# Patient Record
Sex: FEMALE | Race: WHITE | Employment: FULL TIME | URBAN - METROPOLITAN AREA
[De-identification: names, ages, dates, MRNs, and addresses within clinical notes are randomized per-mention and may not be internally consistent; named-entity substitution may affect disease eponyms.]

---

## 2017-01-19 ENCOUNTER — DOCUMENTATION ONLY (OUTPATIENT)
Dept: BEHAVIORAL/MENTAL HEALTH CLINIC | Age: 37
End: 2017-01-19

## 2017-01-19 NOTE — PROGRESS NOTES
Received fax from [employer]. client was in residential treatment program for Bulimia purging type, discharged on 12/13/16. Her d/c meds were- abilify 5mg, lexapro 20 mg daily, melatonin HS.

## 2017-01-26 ENCOUNTER — OFFICE VISIT (OUTPATIENT)
Dept: BEHAVIORAL/MENTAL HEALTH CLINIC | Age: 37
End: 2017-01-26

## 2017-01-26 VITALS
HEART RATE: 59 BPM | WEIGHT: 286 LBS | SYSTOLIC BLOOD PRESSURE: 112 MMHG | DIASTOLIC BLOOD PRESSURE: 85 MMHG | OXYGEN SATURATION: 96 %

## 2017-01-26 DIAGNOSIS — F60.3 BORDERLINE PERSONALITY DISORDER (HCC): ICD-10-CM

## 2017-01-26 DIAGNOSIS — F50.2 BULIMIA NERVOSA, PURGING TYPE: Primary | ICD-10-CM

## 2017-01-26 DIAGNOSIS — F41.9 ANXIETY AND DEPRESSION: ICD-10-CM

## 2017-01-26 DIAGNOSIS — F40.10 SOCIAL ANXIETY DISORDER: ICD-10-CM

## 2017-01-26 DIAGNOSIS — F32.A ANXIETY AND DEPRESSION: ICD-10-CM

## 2017-01-26 RX ORDER — ARIPIPRAZOLE 10 MG/1
10 TABLET ORAL DAILY
Qty: 30 TAB | Refills: 0 | Status: SHIPPED | OUTPATIENT
Start: 2017-01-26 | End: 2017-02-23 | Stop reason: SDUPTHER

## 2017-01-26 RX ORDER — ESCITALOPRAM OXALATE 20 MG/1
TABLET ORAL
Qty: 30 TAB | Refills: 0 | Status: SHIPPED | OUTPATIENT
Start: 2017-01-26 | End: 2017-02-28 | Stop reason: SDUPTHER

## 2017-01-26 NOTE — PROGRESS NOTES
Psychiatric Outpatient Progress Note    Account Number:  [de-identified]  Name: Calvin Cowan    SUBJECTIVE:   CHIEF COMPLAINT:  Calvin Cowan is a 29 y.o. female and was seen today for follow-up of psychiatric condition and psychotropic medication management. HPI:    Eduar Farias reports the following psychiatric symptoms:  bulimia nervosa, MDD, moderate,generalized anxiety disorder. Long h/o bulimia with purging. H/o anorexia nervosa,  H/o Bulimia and purging x 21 years. The symptoms have been present for many years and are of moderate to higher severity. The symptoms occur 2 days a week. Clinet is eating and purging x 3 per week. Client reported sleeping for 8 hrs and denied any difficulty initiating and maintaining sleep. Reported appetite is variable, feels hungry at times, not following meal plan but binging a lot and purging x 5 per week. Reports acid reflux at times. Reported energy level is good, has difficulty focus and concentration at work. Has variable motivation, feels hopeless and guilty  r/t binging and purging. Denied any passive suicide thoughts. Has anxiety daily and all the time. Worries about making mistakes at work.   recently was in 6 weeks residential program and behavior was under control and relapsed on binging in few days. Contributing factors include: stressful job, more conscious at work. No support from family. Patient denies SI/HI/SIB. Side Effects:  none      Fam/Soc Hx (from [place] with updates):    Family History   Problem Relation Age of Onset    Cancer Maternal Grandmother     Heart Disease Maternal Grandfather       Social History   Substance Use Topics    Smoking status: Never Smoker    Smokeless tobacco: Never Used    Alcohol use Yes      Comment: socially       REVIEW OF SYSTEMS:  Psychiatric:  depression, anxiety.   Appetite:no change from normal   Sleep: no change       OBJECTIVE:                 Mental Status exam: WNL except for      Sensorium  oriented to time, place and person   Relations cooperative    Eye Contact    appropriate   Appearance:  age appropriate, casually dressed, within normal Limits and obese   Motor Behavior/Gait:  gait stable and within normal limits   Speech:  normal pitch and normal volume   Thought Process: goal directed, logical and within normal limits   Thought Content free of delusions and free of hallucinations   Suicidal ideations no plan , no intention and none   Homicidal ideations no plan , no intention and none   Mood:  anxious and depressed   Affect:  anxious and depressed   Memory recent  adequate   Memory remote:  adequate   Concentration:  adequate   Abstraction:  abstract   Insight:  fair   Reliability fair   Judgment:  fair       MEDICAL DECISION MAKING  Data: pertinent labs, imaging, medical records and diagnostic tests reviewed and incorporated in diagnosis and treatment plan    No Known Allergies     Current Outpatient Prescriptions   Medication Sig Dispense Refill    escitalopram oxalate (LEXAPRO) 20 mg tablet TAKE 1 TABLET BY MOUTH ONCE DAILY 30 Tab 0    ARIPiprazole (ABILIFY) 10 mg tablet Take 1 Tab by mouth daily. 30 Tab 0    busPIRone (BUSPAR) 5 mg tablet Take 1 Tab by mouth three (3) times daily (with meals). 90 Tab 0        Visit Vitals    /85 (BP 1 Location: Left arm, BP Patient Position: Sitting)    Pulse (!) 59    Wt 129.7 kg (286 lb)    SpO2 96%         Problems addressed today:  Bulimia nervosa purging type, major depressive disorder , moderate , generalized anxiety disorder, Borderline personality disorder. Assessment:   Calvin Husbands  is a 29 y.o.  female  is not responding to treatment. Long h/o bulimia nervosa, MDD, moderate,generalized anxiety disorder. Long h/o bulimia with purging, stated in high school. Symptoms are occur daily. Reported benefits with Escitalopram and Abilify. Client has residential treatment program recently and relapsed on binging and purging again.   Has guilty feeling and hopelessness r/t binging. Client is attending ACT therapy for bulimia x 3 per week. Client reported no benefit with Buspar. Had trial of risperidone, topiramate in past and had no benefit. Feels depressed for not receiving support from her spouse. H/o self mutilating behavior till age 26. Client's is not undergoing DBT and her therapist told her that she does not have BPD. Discussed importance of DBT and CBT in her treatment plan. Patient denies SI/HI/SIB. No evidence of AH/VH or delusions. Plan to increase the dose of Abilify to target depression. Risk Scoring- chronic illnesses and prescription drug management    Treatment Plan:  1. Medications:          Medication Changes/Adjustments: Increase abilify to 10 mg daily                                                                Continue Lexapro 20 mg daily                                                                Discontinue Buspar- no benefit      Current Outpatient Prescriptions   Medication Sig Dispense Refill    escitalopram oxalate (LEXAPRO) 20 mg tablet TAKE 1 TABLET BY MOUTH ONCE DAILY 30 Tab 0    ARIPiprazole (ABILIFY) 10 mg tablet Take 1 Tab by mouth daily. 30 Tab 0    busPIRone (BUSPAR) 5 mg tablet Take 1 Tab by mouth three (3) times daily (with meals). 90 Tab 0                  The following regarding medications was addressed:    (The risks and benefits of the proposed medication; the potential medication side effects ie    dry mouth, weight gain, GI upset, headache; patient given opportunity to ask questions)       2. Counseling and coordination of care including instructions for treatment, risks/benefits, risk factor reduction and patient/family education. She agrees with the plan. Patient instructed to call with any side effects, questions or issues. Instructed patient to call the clinic, and if after hours call the provider on call ifclient experiences any suicidal thought or ideas to hurt self or other.  Also instructed to call 911 or go to the ED. Patient verbalized understanding and agreed to call    3. Follow-up Disposition:  Return in about 4 weeks (around 2/23/2017) for med check and follow up. 4. Other: Nutritional/health counseling on diet and exercise. For reliable dietary information, go to www. EATRIGHT.org. PSYCHOTHERAPY:  approx 16 minutes  Type:  Supportive/Cognitive Behavioral psychotherapy provided  Focus:     Current problems- still purging and eating. Occupational issues- anxious in social setting   Interpersonal conflicts- spouse  Psychoeducation provided  Treatment plan reviewed with patient-including diagnosis and medications    Alexsandra is not progressing.     Fam Terrell NP  1/26/2017

## 2017-01-26 NOTE — PATIENT INSTRUCTIONS
Sleep Tips    What to avoid    · Do not have drinks with caffeine, such as coffee or black tea, for 8 hours before bed. · Do not smoke or use other types of tobacco near bedtime. Nicotine is a stimulant and can keep you awake. · Avoid drinking alcohol late in the evening, because it can cause you to wake in the middle of the night. · Do not eat a big meal close to bedtime. If you are hungry, eat a light snack. · Do not drink a lot of water close to bedtime, because the need to urinate may wake you up during the night. · Do not read or watch TV in bed. Use the bed only for sleeping and sexual activity. What to try    · Go to bed at the same time every night, and wake up at the same time every morning. Do not take naps during the day. · Keep your bedroom quiet, dark, and cool. · Get regular exercise, but not within 3 to 4 hours of your bedtime. · Sleep on a comfortable pillow and mattress. · If watching the clock makes you anxious, turn it facing away from you so you cannot see the time. · If you worry when you lie down, start a worry book. Well before bedtime, write down your worries, and then set the book and your concerns aside. · Try meditation or other relaxation techniques before you go to bed. · If you cannot fall asleep, get up and go to another room until you feel sleepy. Do something relaxing. Repeat your bedtime routine before you go to bed again. Make your house quiet and calm about an hour before bedtime. Turn down the lights, turn off the TV, log off the computer, and turn down the volume on music. This can help you relax after a busy day. Bulimia: Care Instructions  Your Care Instructions  Bulimia nervosa is an eating disorder. People with bulimia are very concerned about body shape and size and are afraid of gaining weight. They may crave food and find ways to eat a lot of it fast. This binge eating is often set off by stress or an emotional upset.  After overeating, people with bulimia may feel guilty, uncomfortable, or ashamed. They may vomit, use laxatives, or exercise excessively to get rid of the food they ate. Counseling to understand the condition and to learn ways to reduce stress is a big part of treatment for bulimia. Nutritional counseling can help you learn how to eat a healthy diet. It may help to have your family take part in family counseling so that they can support you. Treatment with medicines such as antidepressants also can help. Follow-up care is a key part of your treatment and safety. Be sure to make and go to all appointments, and call your doctor if you are having problems. It's also a good idea to know your test results and keep a list of the medicines you take. How can you care for yourself at home? Follow your treatment plan  Take your medicines exactly as prescribed. Call your doctor if you think you are having a problem with your medicine. Go to your counseling sessions. Call or talk with your counselor if you cannot attend or you think the sessions are not helping. Do not just stop going. Learn to be easier on yourself  Remember that feeling bad about yourself and not having hope is part of your condition. As you work with your doctor and counselors, you will start to feel better about yourself. Make a list of things you have learned, things you have done that were hard for you, or things you have changed about yourself. Do not blame yourself for having bulimia. Just work on getting better. Learn to accept help. Remember that your goal is to feel better each day. Take good care of yourself  Get enough sleep. Keep your room dark and quiet, and try to go to bed at the same time every night. Try to lower your stress. Manage your time, build a strong system of social support, and lead a healthy lifestyle. To lower your stress, try physical activity, slow deep breathing, relaxing your muscles, or getting a massage.   Do not use alcohol or illegal drugs. Learn the early signs of sudden, urgent food cravings. Eat a healthy, balanced diet. A balanced diet includes whole grains, dairy, fruits and vegetables, and protein. Eat a variety of foods from each of these groups so that you get all the nutrients you need. When should you call for help? Call 911 anytime you think you may need emergency care. For example, call if:  You feel hopeless or have thoughts of hurting yourself. You cough up blood. You vomit blood or what looks like coffee grounds. You pass reddish brown or very bloody stools. Call your doctor now or seek immediate medical care if:  You have pain in your belly. You have an irregular heartbeat. You feel dizzy or faint. Watch closely for changes in your health, and be sure to contact your doctor if you have any problems. Where can you learn more? Go to http://ricky-jennie.info/. Enter C591 in the search box to learn more about \"Bulimia: Care Instructions. \"  Current as of: July 26, 2016  Content Version: 11.1  © 8366-5103 [employer]. Care instructions adapted under license by [employer] (which disclaims liability or warranty for this information). If you have questions about a medical condition or this instruction, always ask your healthcare professional. [place] [address] any warranty or liability for your use of this information.   ·

## 2017-02-23 DIAGNOSIS — F32.A ANXIETY AND DEPRESSION: ICD-10-CM

## 2017-02-23 DIAGNOSIS — F50.2 BULIMIA NERVOSA, PURGING TYPE: ICD-10-CM

## 2017-02-23 DIAGNOSIS — F41.9 ANXIETY AND DEPRESSION: ICD-10-CM

## 2017-02-23 RX ORDER — ARIPIPRAZOLE 10 MG/1
10 TABLET ORAL DAILY
Qty: 7 TAB | Refills: 0 | Status: SHIPPED | OUTPATIENT
Start: 2017-02-23 | End: 2017-02-28 | Stop reason: SDUPTHER

## 2017-02-28 ENCOUNTER — OFFICE VISIT (OUTPATIENT)
Dept: BEHAVIORAL/MENTAL HEALTH CLINIC | Age: 37
End: 2017-02-28

## 2017-02-28 VITALS
HEART RATE: 74 BPM | OXYGEN SATURATION: 98 % | SYSTOLIC BLOOD PRESSURE: 120 MMHG | DIASTOLIC BLOOD PRESSURE: 87 MMHG | WEIGHT: 286 LBS

## 2017-02-28 DIAGNOSIS — F41.9 ANXIETY AND DEPRESSION: ICD-10-CM

## 2017-02-28 DIAGNOSIS — F40.10 SOCIAL ANXIETY DISORDER: ICD-10-CM

## 2017-02-28 DIAGNOSIS — F50.2 BULIMIA NERVOSA, PURGING TYPE: ICD-10-CM

## 2017-02-28 DIAGNOSIS — F32.A ANXIETY AND DEPRESSION: ICD-10-CM

## 2017-02-28 RX ORDER — ESCITALOPRAM OXALATE 20 MG/1
TABLET ORAL
Qty: 30 TAB | Refills: 1 | Status: SHIPPED | OUTPATIENT
Start: 2017-02-28 | End: 2017-04-26 | Stop reason: SDUPTHER

## 2017-02-28 RX ORDER — ARIPIPRAZOLE 15 MG/1
15 TABLET ORAL DAILY
Qty: 30 TAB | Refills: 1 | Status: SHIPPED | OUTPATIENT
Start: 2017-02-28 | End: 2017-04-26 | Stop reason: SDUPTHER

## 2017-02-28 NOTE — PROGRESS NOTES
Psychiatric Outpatient Progress Note    Account Number:  [de-identified]  Name: Demetri Lancaster    SUBJECTIVE:   CHIEF COMPLAINT:  Demetri Lancaster is a 29 y.o. female and was seen today for follow-up of psychiatric condition and psychotropic medication management. HPI:    Tony Mahoney reports the following psychiatric symptoms:  bulimia nervosa, MDD, moderate,generalized anxiety disorder. Long h/o bulimia with purging. H/o anorexia nervosa,  H/o Bulimia and purging x 21 years. The symptoms have been present for many years and are of moderate to higher severity. The symptoms occur 3 days a week. Client is eating and purging x 3 per week. Client reported sleeping for 8 hrs and denied any difficulty initiating and maintaining sleep. Reported appetite is variable, feels hungry at times, following meal plan reduced  purging x 3 per week. . Reported energy level is good, has difficulty focus and concentration at work. Has variable motivation, feels hopeless and guilty  r/t binging and purging. Denied any passive suicide thoughts. Has anxiety daily and all the time. Worries about making mistakes at work.  Contributing factors include: stressful job, more conscious at work. No support from family. Patient denies SI/HI/SIB. Side Effects:  none      Fam/Soc Hx (from [place] with updates):    Family History   Problem Relation Age of Onset    Cancer Maternal Grandmother     Heart Disease Maternal Grandfather       Social History   Substance Use Topics    Smoking status: Never Smoker    Smokeless tobacco: Never Used    Alcohol use Yes      Comment: socially       REVIEW OF SYSTEMS:  Psychiatric:  depression, anxiety.   Appetite:no change from normal   Sleep: no change                    Mental Status exam: WNL except for      Sensorium  oriented to time, place and person   Relations cooperative    Eye Contact    appropriate   Appearance:  age appropriate, casually dressed, within normal Limits and obese   Motor Behavior/Gait: gait stable and within normal limits   Speech:  normal pitch and normal volume   Thought Process: goal directed, logical and within normal limits   Thought Content free of delusions and free of hallucinations   Suicidal ideations no plan , no intention and none   Homicidal ideations no plan , no intention and none   Mood:  anxious and depressed   Affect:  anxious and depressed   Memory recent  adequate   Memory remote:  adequate   Concentration:  adequate   Abstraction:  abstract   Insight:  fair   Reliability fair   Judgment:  fair       MEDICAL DECISION MAKING  Data: pertinent labs, imaging, medical records and diagnostic tests reviewed and incorporated in diagnosis and treatment plan    No Known Allergies     Current Outpatient Prescriptions   Medication Sig Dispense Refill    ARIPiprazole (ABILIFY) 15 mg tablet Take 1 Tab by mouth daily. 30 Tab 1    escitalopram oxalate (LEXAPRO) 20 mg tablet TAKE 1 TABLET BY MOUTH ONCE DAILY 30 Tab 1        Visit Vitals    /87 (BP 1 Location: Left arm, BP Patient Position: Sitting)    Pulse 74    Wt 129.7 kg (286 lb)    SpO2 98%         Problems addressed today:  Bulimia nervosa purging type, major depressive disorder , moderate , generalized anxiety disorder, Borderline personality disorder. Assessment:   Misti Kirkland  is a 29 y.o.  obese female  is not responding to treatment. Long h/o bulimia nervosa, MDD, moderate,generalized anxiety disorder. Long h/o bulimia with purging, stated in high school. Symptoms are occur daily. Reported benefits with Escitalopram and Abilify. Client has residential treatment program recently and relapsed on binging and purging again. Has guilty feeling and mild hopelessness r/t binging. Reported decreased interest and decreased motivation. Had trial of risperidone, topiramate in past and had no benefit. H/o self mutilating behavior till age 26.   Reported some improvement in mood and decrease in urges to eat and purging and has reduced to 3 times a week from 5 days week. Plan to increase the dose of Abilify. Client has reduced therapy to twice a week. Discussed importance of DBT and CBT in her treatment plan and continuing it. She is currently on diet plan and exchange diet and is working with dietician and meets once a week. She has been under therapy and dietician for one year and feels that it's helping. Patient denies SI/HI/SIB. No evidence of AH/VH or delusions. Plan to increase the dose of Abilify to target depression. Risk Scoring- chronic illnesses and prescription drug management    Treatment Plan:  1. Medications:          Medication Changes/Adjustments: Increase abilify to 15 mg daily                                                                Continue Lexapro 20 mg daily                                                                Discontinue Buspar- no benefit      Current Outpatient Prescriptions   Medication Sig Dispense Refill    ARIPiprazole (ABILIFY) 15 mg tablet Take 1 Tab by mouth daily. 30 Tab 1    escitalopram oxalate (LEXAPRO) 20 mg tablet TAKE 1 TABLET BY MOUTH ONCE DAILY 30 Tab 1                  The following regarding medications was addressed:    (The risks and benefits of the proposed medication; the potential medication side effects ie    dry mouth, weight gain, GI upset, headache; patient given opportunity to ask questions)       2. Counseling and coordination of care including instructions for treatment, risks/benefits, risk factor reduction and patient/family education. She agrees with the plan. Patient instructed to call with any side effects, questions or issues. Instructed patient to call the clinic, and if after hours call the provider on call ifclient experiences any suicidal thought or ideas to hurt self or other. Also instructed to call 911 or go to the ED. Patient verbalized understanding and agreed to call    3.   Follow-up Disposition:  Return in about 2 months (around 2017) for med check and follow up. 4. Other: Nutritional/health counseling on diet and exercise. For reliable dietary information, go to www. EATRIGHT.org. PSYCHOTHERAPY:  approx 16 minutes  Type:  Supportive/Cognitive Behavioral psychotherapy provided  Focus:     Current problems-  purging and eating. Occupational issues- anxious in social setting and work. Interpersonal conflicts- spouse  Psychoeducation provided  Treatment plan reviewed with patient-including diagnosis and medications    Alexsandra is partially progressing.     Tran Maravilla NP  2017

## 2017-02-28 NOTE — PATIENT INSTRUCTIONS
Bulimia: Care Instructions  Your Care Instructions  Bulimia nervosa is an eating disorder. People with bulimia are very concerned about body shape and size and are afraid of gaining weight. They may crave food and find ways to eat a lot of it fast. This binge eating is often set off by stress or an emotional upset. After overeating, people with bulimia may feel guilty, uncomfortable, or ashamed. They may vomit, use laxatives, or exercise excessively to get rid of the food they ate. Counseling to understand the condition and to learn ways to reduce stress is a big part of treatment for bulimia. Nutritional counseling can help you learn how to eat a healthy diet. It may help to have your family take part in family counseling so that they can support you. Treatment with medicines such as antidepressants also can help. Follow-up care is a key part of your treatment and safety. Be sure to make and go to all appointments, and call your doctor if you are having problems. It's also a good idea to know your test results and keep a list of the medicines you take. How can you care for yourself at home? Follow your treatment plan  · Take your medicines exactly as prescribed. Call your doctor if you think you are having a problem with your medicine. · Go to your counseling sessions. Call or talk with your counselor if you cannot attend or you think the sessions are not helping. Do not just stop going. Learn to be easier on yourself  · Remember that feeling bad about yourself and not having hope is part of your condition. As you work with your doctor and counselors, you will start to feel better about yourself. · Make a list of things you have learned, things you have done that were hard for you, or things you have changed about yourself. · Do not blame yourself for having bulimia. Just work on getting better. · Learn to accept help. · Remember that your goal is to feel better each day.   Take good care of yourself  · Get enough sleep. Keep your room dark and quiet, and try to go to bed at the same time every night. · Try to lower your stress. Manage your time, build a strong system of social support, and lead a healthy lifestyle. To lower your stress, try physical activity, slow deep breathing, relaxing your muscles, or getting a massage. · Do not use alcohol or illegal drugs. · Learn the early signs of sudden, urgent food cravings. · Eat a healthy, balanced diet. A balanced diet includes whole grains, dairy, fruits and vegetables, and protein. Eat a variety of foods from each of these groups so that you get all the nutrients you need. When should you call for help? Call 911 anytime you think you may need emergency care. For example, call if:  · You feel hopeless or have thoughts of hurting yourself. · You cough up blood. · You vomit blood or what looks like coffee grounds. · You pass reddish brown or very bloody stools. Call your doctor now or seek immediate medical care if:  · You have pain in your belly. · You have an irregular heartbeat. · You feel dizzy or faint. Watch closely for changes in your health, and be sure to contact your doctor if you have any problems. Where can you learn more? Go to http://ricky-jennie.info/. Enter M874 in the search box to learn more about \"Bulimia: Care Instructions. \"  Current as of: July 26, 2016  Content Version: 11.1  © 8248-5116 "[employer] Care instructions adapted under license by [employer] (which disclaims liability or warranty for this information). If you have questions about a medical condition or this instruction, always ask your healthcare professional. [place] [address] any warranty or liability for your use of this information.

## 2017-04-26 ENCOUNTER — OFFICE VISIT (OUTPATIENT)
Dept: BEHAVIORAL/MENTAL HEALTH CLINIC | Age: 37
End: 2017-04-26

## 2017-04-26 VITALS — DIASTOLIC BLOOD PRESSURE: 83 MMHG | OXYGEN SATURATION: 98 % | HEART RATE: 69 BPM | SYSTOLIC BLOOD PRESSURE: 116 MMHG

## 2017-04-26 DIAGNOSIS — F40.10 SOCIAL ANXIETY DISORDER: ICD-10-CM

## 2017-04-26 DIAGNOSIS — F50.2 BULIMIA NERVOSA, PURGING TYPE: ICD-10-CM

## 2017-04-26 DIAGNOSIS — F32.A ANXIETY AND DEPRESSION: ICD-10-CM

## 2017-04-26 DIAGNOSIS — F41.9 ANXIETY AND DEPRESSION: ICD-10-CM

## 2017-04-26 RX ORDER — ESCITALOPRAM OXALATE 20 MG/1
TABLET ORAL
Qty: 30 TAB | Refills: 0 | Status: SHIPPED | OUTPATIENT
Start: 2017-04-26 | End: 2017-05-22 | Stop reason: SDUPTHER

## 2017-04-26 RX ORDER — ARIPIPRAZOLE 20 MG/1
20 TABLET ORAL DAILY
Qty: 30 TAB | Refills: 0 | Status: SHIPPED | OUTPATIENT
Start: 2017-04-26 | End: 2017-05-22 | Stop reason: SDUPTHER

## 2017-04-26 NOTE — PATIENT INSTRUCTIONS
For reliable dietary information, go to www. [email or web address].      Bulimia: Care Instructions  Your Care Instructions  Bulimia nervosa is an eating disorder. People with bulimia are very concerned about body shape and size and are afraid of gaining weight. They may crave food and find ways to eat a lot of it fast. This binge eating is often set off by stress or an emotional upset. After overeating, people with bulimia may feel guilty, uncomfortable, or ashamed. They may vomit, use laxatives, or exercise excessively to get rid of the food they ate. Counseling to understand the condition and to learn ways to reduce stress is a big part of treatment for bulimia. Nutritional counseling can help you learn how to eat a healthy diet. It may help to have your family take part in family counseling so that they can support you. Treatment with medicines such as antidepressants also can help. Follow-up care is a key part of your treatment and safety. Be sure to make and go to all appointments, and call your doctor if you are having problems. It's also a good idea to know your test results and keep a list of the medicines you take. How can you care for yourself at home? Follow your treatment plan  · Take your medicines exactly as prescribed. Call your doctor if you think you are having a problem with your medicine. · Go to your counseling sessions. Call or talk with your counselor if you cannot attend or you think the sessions are not helping. Do not just stop going. Learn to be easier on yourself  · Remember that feeling bad about yourself and not having hope is part of your condition. As you work with your doctor and counselors, you will start to feel better about yourself. · Make a list of things you have learned, things you have done that were hard for you, or things you have changed about yourself. · Do not blame yourself for having bulimia. Just work on getting better. · Learn to accept help.   · Remember that your goal is to feel better each day. Take good care of yourself  · Get enough sleep. Keep your room dark and quiet, and try to go to bed at the same time every night. · Try to lower your stress. Manage your time, build a strong system of social support, and lead a healthy lifestyle. To lower your stress, try physical activity, slow deep breathing, relaxing your muscles, or getting a massage. · Do not use alcohol or illegal drugs. · Learn the early signs of sudden, urgent food cravings. · Eat a healthy, balanced diet. A balanced diet includes whole grains, dairy, fruits and vegetables, and protein. Eat a variety of foods from each of these groups so that you get all the nutrients you need. When should you call for help? Call 911 anytime you think you may need emergency care. For example, call if:  · You feel hopeless or have thoughts of hurting yourself. · You cough up blood. · You vomit blood or what looks like coffee grounds. · You pass reddish brown or very bloody stools. Call your doctor now or seek immediate medical care if:  · You have pain in your belly. · You have an irregular heartbeat. · You feel dizzy or faint. Watch closely for changes in your health, and be sure to contact your doctor if you have any problems. Where can you learn more? Go to http://ricky-jennie.info/. Enter W619 in the search box to learn more about \"Bulimia: Care Instructions. \"  Current as of: July 26, 2016  Content Version: 11.2  © 0179-5935 [employer]. Care instructions adapted under license by [employer] (which disclaims liability or warranty for this information). If you have questions about a medical condition or this instruction, always ask your healthcare professional. [address] any warranty or liability for your use of this information.

## 2017-04-26 NOTE — PROGRESS NOTES
Psychiatric Outpatient Progress Note    Account Number:  [de-identified]  Name: Lux Garcia    SUBJECTIVE:   CHIEF COMPLAINT:  Lux Garcia is a 40 y.o. female and was seen today for follow-up of psychiatric condition and psychotropic medication management. HPI:    Fernandes  reports the following psychiatric symptoms:  bulimia nervosa, MDD, moderate,generalized anxiety disorder. Long h/o bulimia with purging. H/o anorexia nervosa,  H/o Bulimia and purging x 21 years. The symptoms have been present for many years and are of moderate to higher severity. The symptoms occur 3 days a week. Client is eating and purging daily and last week decreased to 3 days per week. Client reported sleeping for 8 hrs and denied any difficulty initiating and maintaining sleep. Reported appetite is variable, feels hungry at times, following meal plan reduced  purging x 2 per week. Reported energy level is good, has difficulty focus and concentration at work. Has variable motivation, feels hopeless and guilty  r/t binging and purging. Denied any passive suicide thoughts. Has anxiety daily and all the time. Worries about making mistakes at work.  Contributing factors include: stressful job, more conscious at work. No support from family. Patient denies SI/HI/SIB. Side Effects:  none      Fam/Soc Hx (from [place] with updates):    Family History   Problem Relation Age of Onset    Cancer Maternal Grandmother     Heart Disease Maternal Grandfather       Social History   Substance Use Topics    Smoking status: Never Smoker    Smokeless tobacco: Never Used    Alcohol use Yes      Comment: socially       REVIEW OF SYSTEMS:  Psychiatric:  depression, anxiety.   Appetite:no change from normal   Sleep: no change                    Mental Status exam: WNL except for      Sensorium  oriented to time, place and person   Relations cooperative    Eye Contact    appropriate   Appearance:  age appropriate, casually dressed, within normal Limits and obese   Motor Behavior/Gait:  gait stable and within normal limits   Speech:  normal pitch and normal volume   Thought Process: goal directed, logical and within normal limits   Thought Content free of delusions and free of hallucinations   Suicidal ideations no plan , no intention and none   Homicidal ideations no plan , no intention and none   Mood:  anxious and depressed   Affect:  anxious and depressed   Memory recent  adequate   Memory remote:  adequate   Concentration:  adequate   Abstraction:  abstract   Insight:  fair   Reliability fair   Judgment:  fair       MEDICAL DECISION MAKING  Data: pertinent labs, imaging, medical records and diagnostic tests reviewed and incorporated in diagnosis and treatment plan    No Known Allergies     Current Outpatient Prescriptions   Medication Sig Dispense Refill    ARIPiprazole (ABILIFY) 15 mg tablet Take 1 Tab by mouth daily. 30 Tab 1    escitalopram oxalate (LEXAPRO) 20 mg tablet TAKE 1 TABLET BY MOUTH ONCE DAILY 30 Tab 1        Visit Vitals    /83 (BP 1 Location: Left arm, BP Patient Position: Sitting)    Pulse 69    SpO2 98%         Problems addressed today:  Bulimia nervosa purging type, major depressive disorder , moderate , generalized anxiety disorder, Borderline personality disorder. Assessment:   Lux Garcia  is a 40 y.o.  obese female  is not responding to treatment. Long h/o bulimia nervosa, MDD, moderate,generalized anxiety disorder. Long h/o bulimia with purging, stated in high school. Symptoms are occur daily. Reported benefits with Escitalopram and Abilify. Client has residential treatment program recently and relapsed on binging and purging again. Has guilty feeling and mild hopelessness r/t binging. Reported imropved interest and energy since weather change. Has started running. Had trial of risperidone, topiramate in past and had no benefit. H/o self mutilating behavior till age 26.   Reported some improvement in mood and no change in urges to eat and purging and has worsened to daily. Plan to increase the dose of Abilify at this time. Plan to increase Escitalopram in next visit. Client is continuing  therapy to twice a week. Discussed importance of DBT and CBT in her treatment plan and continuing it. She is currently on diet plan and exchange diet and is working with dietician and meets once a week. She has been under therapy and dietician for one year and feels that it's helping. Patient denies SI/HI/SIB. No evidence of AH/VH or delusions. Plan to increase the dose of Abilify to target depression. Risk Scoring- chronic illnesses and prescription drug management    Treatment Plan:  1. Medications:          Medication Changes/Adjustments: Increase abilify to 20 mg daily                                                                Continue Lexapro 20 mg daily                                                                Discontinue Buspar- no benefit      Current Outpatient Prescriptions   Medication Sig Dispense Refill    ARIPiprazole (ABILIFY) 15 mg tablet Take 1 Tab by mouth daily. 30 Tab 1    escitalopram oxalate (LEXAPRO) 20 mg tablet TAKE 1 TABLET BY MOUTH ONCE DAILY 30 Tab 1                  The following regarding medications was addressed:    (The risks and benefits of the proposed medication; the potential medication side effects ie    dry mouth, weight gain, GI upset, headache; patient given opportunity to ask questions)       2. Counseling and coordination of care including instructions for treatment, risks/benefits, risk factor reduction and patient/family education. She agrees with the plan. Patient instructed to call with any side effects, questions or issues. Instructed patient to call the clinic, and if after hours call the provider on call ifclient experiences any suicidal thought or ideas to hurt self or other. Also instructed to call 911 or go to the ED.  Patient verbalized understanding and agreed to call    3. Follow-up Disposition:  Return in about 4 weeks (around 2017) for med check and follow up. 4. Other: Nutritional/health counseling on diet and exercise. For reliable dietary information, go to www. [email or web address]. PSYCHOTHERAPY:  approx 16 minutes  Type:  Supportive/Cognitive Behavioral psychotherapy provided  Focus:     Current problems-  purging and eating. Occupational issues- anxious in social setting and work. Interpersonal conflicts- spouse  Psychoeducation provided  Treatment plan reviewed with patient-including diagnosis and medications    Alexsandra is partially progressing.     Mira Flores, NP  2017

## 2017-05-22 ENCOUNTER — OFFICE VISIT (OUTPATIENT)
Dept: BEHAVIORAL/MENTAL HEALTH CLINIC | Age: 37
End: 2017-05-22

## 2017-05-22 VITALS
HEART RATE: 67 BPM | SYSTOLIC BLOOD PRESSURE: 116 MMHG | DIASTOLIC BLOOD PRESSURE: 80 MMHG | OXYGEN SATURATION: 98 % | WEIGHT: 288 LBS

## 2017-05-22 DIAGNOSIS — F41.9 ANXIETY AND DEPRESSION: ICD-10-CM

## 2017-05-22 DIAGNOSIS — F50.2 BULIMIA NERVOSA, MODERATE: Primary | ICD-10-CM

## 2017-05-22 DIAGNOSIS — F32.A ANXIETY AND DEPRESSION: ICD-10-CM

## 2017-05-22 DIAGNOSIS — F40.10 SOCIAL ANXIETY DISORDER: ICD-10-CM

## 2017-05-22 DIAGNOSIS — F50.2 BULIMIA NERVOSA, PURGING TYPE: ICD-10-CM

## 2017-05-22 RX ORDER — ARIPIPRAZOLE 15 MG/1
30 TABLET ORAL DAILY
Qty: 60 TAB | Refills: 0 | Status: SHIPPED | OUTPATIENT
Start: 2017-05-22 | End: 2017-06-27 | Stop reason: SDUPTHER

## 2017-05-22 RX ORDER — ESCITALOPRAM OXALATE 20 MG/1
30 TABLET ORAL DAILY
Qty: 30 TAB | Refills: 0 | Status: SHIPPED | OUTPATIENT
Start: 2017-05-22 | End: 2017-06-27 | Stop reason: SDUPTHER

## 2017-05-22 NOTE — PROGRESS NOTES
Psychiatric Outpatient Progress Note    Account Number:  [de-identified]  Name: Duarte Amaro    SUBJECTIVE:   CHIEF COMPLAINT:  Duarte Amaro is a 40 y.o. female and was seen today for follow-up of psychiatric condition and psychotropic medication management. HPI:    Christoph Lisa reports the following psychiatric symptoms:  bulimia nervosa, MDD, moderate,generalized anxiety disorder. Long h/o bulimia with purging. H/o anorexia nervosa,  H/o Bulimia and purging x 21 years. The symptoms have been present for many years and are of moderate to higher severity. The symptoms occur 3 days a week. Client is eating and purging daily and last week decreased to 3 days per week. Client reported sleeping for 8 hrs and denied any difficulty initiating and maintaining sleep. Reported appetite is variable, feels hungry at times, following meal plan reduced  purging x 2 per week. Reported energy level is good, has difficulty focus and concentration at work. Has variable motivation, feels hopeless and guilty  r/t binging and purging. Denied any passive suicide thoughts. Has anxiety daily and all the time. Worries about making mistakes at work.  Contributing factors include: stressful job, more conscious at work. No support from family. Patient denies SI/HI/SIB. Side Effects:  none      Fam/Soc Hx (from [place] with updates):    Family History   Problem Relation Age of Onset    Cancer Maternal Grandmother     Heart Disease Maternal Grandfather       Social History   Substance Use Topics    Smoking status: Never Smoker    Smokeless tobacco: Never Used    Alcohol use Yes      Comment: socially       REVIEW OF SYSTEMS:  Psychiatric:  depression, anxiety.   Appetite:no change from normal   Sleep: no change                    Mental Status exam: WNL except for      Sensorium  oriented to time, place and person   Relations cooperative    Eye Contact    appropriate   Appearance:  age appropriate, casually dressed, within normal Limits and obese   Motor Behavior/Gait:  gait stable and within normal limits   Speech:  normal pitch and normal volume   Thought Process: goal directed, logical and within normal limits   Thought Content free of delusions and free of hallucinations   Suicidal ideations no plan , no intention and none   Homicidal ideations no plan , no intention and none   Mood:  anxious and depressed   Affect:  anxious and depressed   Memory recent  adequate   Memory remote:  adequate   Concentration:  adequate   Abstraction:  abstract   Insight:  fair   Reliability fair   Judgment:  fair       MEDICAL DECISION MAKING  Data: pertinent labs, imaging, medical records and diagnostic tests reviewed and incorporated in diagnosis and treatment plan    No Known Allergies     Current Outpatient Prescriptions   Medication Sig Dispense Refill    ARIPiprazole (ABILIFY) 15 mg tablet Take 2 Tabs by mouth daily. 60 Tab 0    escitalopram oxalate (LEXAPRO) 20 mg tablet Take 1.5 Tabs by mouth daily. 30 Tab 0        Visit Vitals    /80 (BP 1 Location: Left arm, BP Patient Position: Sitting)    Pulse 67    Wt 130.6 kg (288 lb)    SpO2 98%         Problems addressed today:  Bulimia nervosa purging type, major depressive disorder , moderate , generalized anxiety disorder, Borderline personality disorder. Assessment:   See Bloom  is a 40 y.o.  obese female  is not responding to treatment. Long h/o bulimia nervosa, MDD, moderate,generalized anxiety disorder. Long h/o bulimia with purging, stated in high school. Had trial of risperidone, topiramate in past and had no benefit. H/o self mutilating behavior till age 26. Symptoms are occur daily. Reported benefits with Escitalopram and Abilify. Client has reported one week of not purging and then did one week daily. Reported guilty feeling and she was not able to control and has impulsive urges to do it and relapsed. She hates her physical appearance.  Reported increased purging when she was on vacation. Reported mild hopelessness r/t binging. Reported improved interest, active and started running. Reported some improvement in mood and no change in urges to eat and purging and has worsened to daily. Client has bulimia resistant to medication. Plan to increase Escitalopram to 40 max dose  in next visit depending on the response from increase in medication. If no benefit with increase dose of abilify would try alternative medication. Client is continuing  therapy to twice a week. Discussed importance of DBT and CBT in her treatment plan and continuing it. She is currently on diet plan and exchange diet and is working with dietician and meets once a week. She has been under therapy and dietician for one year and feels that it's helping. Patient denies SI/HI/SIB. No evidence of AH/VH or delusions. Risk Scoring- chronic illnesses and prescription drug management    Treatment Plan:  1. Medications:          Medication Changes/Adjustments: Increase abilify to 30 mg daily                                                                Increase Lexapro 30 mg daily                                                                Discontinue Buspar- no benefit      Current Outpatient Prescriptions   Medication Sig Dispense Refill    ARIPiprazole (ABILIFY) 15 mg tablet Take 2 Tabs by mouth daily. 60 Tab 0    escitalopram oxalate (LEXAPRO) 20 mg tablet Take 1.5 Tabs by mouth daily. 30 Tab 0                  The following regarding medications was addressed:    (The risks and benefits of the proposed medication; the potential medication side effects ie    dry mouth, weight gain, GI upset, headache; patient given opportunity to ask questions). Serotonin syndrome is characterized by neuromuscular hyperreactivity (tremor, hyperreflexia, myoclonus),Hyperthermia, altered mental status, while NMS involves sluggish neuromuscular responses (rigidity, bradyreflexia).  Hyperreflexia and myoclonus are rare in NMS 2.  Counseling and coordination of care including instructions for treatment, risks/benefits, risk factor reduction and patient/family education. She agrees with the plan. Patient instructed to call with any side effects, questions or issues. Instructed patient to call the clinic, and if after hours call the provider on call ifclient experiences any suicidal thought or ideas to hurt self or other. Also instructed to call 911 or go to the ED. Patient verbalized understanding and agreed to call    3. Follow-up Disposition:  Return in about 4 weeks (around 6/19/2017) for med check and follow up. 4. Other: Nutritional/health counseling on diet and exercise. For reliable dietary information, go to www. [email or web address]. PSYCHOTHERAPY:  approx 16 minutes  Type:  Supportive/Cognitive Behavioral psychotherapy provided  Focus:     Current problems- variable purging and eating. Occupational issues- anxious in social setting, home and work. Interpersonal conflicts- spouse  Psychoeducation provided  Treatment plan reviewed with patient-including diagnosis and medications    Alexsandra is partially progressing.     Mira Flores NP  5/22/2017

## 2017-06-27 DIAGNOSIS — F32.A ANXIETY AND DEPRESSION: ICD-10-CM

## 2017-06-27 DIAGNOSIS — F41.9 ANXIETY AND DEPRESSION: ICD-10-CM

## 2017-06-27 DIAGNOSIS — F50.2 BULIMIA NERVOSA, MODERATE: ICD-10-CM

## 2017-06-27 DIAGNOSIS — F50.2 BULIMIA NERVOSA, PURGING TYPE: ICD-10-CM

## 2017-06-27 DIAGNOSIS — F40.10 SOCIAL ANXIETY DISORDER: ICD-10-CM

## 2017-06-27 RX ORDER — ESCITALOPRAM OXALATE 20 MG/1
30 TABLET ORAL DAILY
Qty: 25 TAB | Refills: 0 | Status: SHIPPED | OUTPATIENT
Start: 2017-06-27 | End: 2017-08-01 | Stop reason: SDUPTHER

## 2017-06-27 RX ORDER — ARIPIPRAZOLE 15 MG/1
30 TABLET ORAL DAILY
Qty: 30 TAB | Refills: 0 | Status: SHIPPED | OUTPATIENT
Start: 2017-06-27 | End: 2017-08-01 | Stop reason: SDUPTHER

## 2017-08-01 ENCOUNTER — OFFICE VISIT (OUTPATIENT)
Dept: BEHAVIORAL/MENTAL HEALTH CLINIC | Age: 37
End: 2017-08-01

## 2017-08-01 VITALS
HEART RATE: 70 BPM | DIASTOLIC BLOOD PRESSURE: 78 MMHG | WEIGHT: 287 LBS | SYSTOLIC BLOOD PRESSURE: 101 MMHG | OXYGEN SATURATION: 96 %

## 2017-08-01 DIAGNOSIS — F50.2 BULIMIA NERVOSA, MODERATE: ICD-10-CM

## 2017-08-01 DIAGNOSIS — F50.2 BULIMIA NERVOSA, PURGING TYPE: ICD-10-CM

## 2017-08-01 DIAGNOSIS — F40.10 SOCIAL ANXIETY DISORDER: ICD-10-CM

## 2017-08-01 DIAGNOSIS — F41.9 ANXIETY AND DEPRESSION: ICD-10-CM

## 2017-08-01 DIAGNOSIS — F50.2 BULIMIA NERVOSA, SEVERE: Primary | ICD-10-CM

## 2017-08-01 DIAGNOSIS — F32.A ANXIETY AND DEPRESSION: ICD-10-CM

## 2017-08-01 RX ORDER — ESCITALOPRAM OXALATE 20 MG/1
30 TABLET ORAL DAILY
Qty: 45 TAB | Refills: 0 | Status: SHIPPED | OUTPATIENT
Start: 2017-08-01 | End: 2017-08-30 | Stop reason: SDUPTHER

## 2017-08-01 RX ORDER — BUSPIRONE HYDROCHLORIDE 10 MG/1
TABLET ORAL
Qty: 90 TAB | Refills: 0 | Status: SHIPPED | OUTPATIENT
Start: 2017-08-01 | End: 2017-08-30 | Stop reason: SDUPTHER

## 2017-08-01 RX ORDER — ARIPIPRAZOLE 15 MG/1
15 TABLET ORAL DAILY
Qty: 30 TAB | Refills: 0 | Status: SHIPPED | OUTPATIENT
Start: 2017-08-01 | End: 2017-08-30 | Stop reason: SDUPTHER

## 2017-08-01 NOTE — PATIENT INSTRUCTIONS
For reliable dietary information, go to www. [email or web address]. Sleep Tips    What to avoid    · Do not have drinks with caffeine, such as coffee or black tea, for 8 hours before bed. · Do not smoke or use other types of tobacco near bedtime. Nicotine is a stimulant and can keep you awake. · Avoid drinking alcohol late in the evening, because it can cause you to wake in the middle of the night. · Do not eat a big meal close to bedtime. If you are hungry, eat a light snack. · Do not drink a lot of water close to bedtime, because the need to urinate may wake you up during the night. · Do not read or watch TV in bed. Use the bed only for sleeping and sexual activity. What to try    · Go to bed at the same time every night, and wake up at the same time every morning. Do not take naps during the day. · Keep your bedroom quiet, dark, and cool. · Get regular exercise, but not within 3 to 4 hours of your bedtime. · Sleep on a comfortable pillow and mattress. · If watching the clock makes you anxious, turn it facing away from you so you cannot see the time. · If you worry when you lie down, start a worry book. Well before bedtime, write down your worries, and then set the book and your concerns aside. · Try meditation or other relaxation techniques before you go to bed. · If you cannot fall asleep, get up and go to another room until you feel sleepy. Do something relaxing. Repeat your bedtime routine before you go to bed again. Make your house quiet and calm about an hour before bedtime. Turn down the lights, turn off the TV, log off the computer, and turn down the volume on music. This can help you relax after a busy day.

## 2017-08-01 NOTE — PROGRESS NOTES
Psychiatric Outpatient Progress Note    Account Number:  [de-identified]  Name: Jodi Kaur    SUBJECTIVE:   CHIEF COMPLAINT:  Jodi Kaur is a 40 y.o. female and was seen today for follow-up of psychiatric condition and psychotropic medication management. HPI:    Yoko Camarillo reports the following psychiatric symptoms:  bulimia nervosa, MDD, moderate,generalized anxiety disorder. Long h/o bulimia with purging. H/o anorexia nervosa,  H/o Bulimia and purging x 21 years. The symptoms have been present for many years and are of moderate to high severity. The symptoms of anxiety is through out the day. Feels anxious and worries a lot. Reported now binging and purging  x 2 per week as contract set per her therapist. Reported it is difficult to cope and will be continuing x 1 month. Slow weaning of of her purging. Client reported sleeping for 8 hrs and denied any difficulty initiating and maintaining sleep. Reported appetite is variable, feels hungry at times, following meal plan reduced  purging x 2 per week. Reported energy level is good, has difficulty focus and concentration at work. Has variable motivation, feels hopeless and guilty  r/t binging and purging. Denied any passive suicide thoughts. Worries about making mistakes at work.      Contributing factors include: stressful job, more conscious at work. No support from spouse. Patient denies SI/HI/SIB. Side Effects:  none      Fam/Soc Hx (from [place] with updates):    Family History   Problem Relation Age of Onset    Cancer Maternal Grandmother     Heart Disease Maternal Grandfather       Social History   Substance Use Topics    Smoking status: Never Smoker    Smokeless tobacco: Never Used    Alcohol use Yes      Comment: socially       REVIEW OF SYSTEMS:  Psychiatric:  depression, anxiety.   Appetite:no change from normal   Sleep: no change                    Mental Status exam: WNL except for      Sensorium  oriented to time, place and person   Relations cooperative    Eye Contact    appropriate   Appearance:  age appropriate, casually dressed, within normal Limits and obese   Motor Behavior/Gait:  gait stable and within normal limits   Speech:  normal pitch and normal volume   Thought Process: goal directed, logical and within normal limits   Thought Content free of delusions and free of hallucinations   Suicidal ideations no plan , no intention and none   Homicidal ideations no plan , no intention and none   Mood:  anxious and depressed   Affect:  anxious and depressed   Memory recent  adequate   Memory remote:  adequate   Concentration:  adequate   Abstraction:  abstract   Insight:  fair   Reliability fair   Judgment:  fair       MEDICAL DECISION MAKING  Data: pertinent labs, imaging, medical records and diagnostic tests reviewed and incorporated in diagnosis and treatment plan    No Known Allergies     Current Outpatient Prescriptions   Medication Sig Dispense Refill    ARIPiprazole (ABILIFY) 15 mg tablet Take 1 Tab by mouth daily. 30 Tab 0    busPIRone (BUSPAR) 10 mg tablet Please take 1 tablet two times a day for 1 week and then take three times a day with meals 90 Tab 0    escitalopram oxalate (LEXAPRO) 20 mg tablet Take 1.5 Tabs by mouth daily. 45 Tab 0        Visit Vitals    /78 (BP 1 Location: Left arm, BP Patient Position: Sitting)    Pulse 70    Wt 130.2 kg (287 lb)    SpO2 96%         Problems addressed today:  Bulimia nervosa purging type, major depressive disorder , moderate , generalized anxiety disorder, Borderline personality disorder. Assessment:   Darwin Yusuf  is a 40 y.o.  obese female  is not responding to treatment. Long h/o bulimia nervosa, MDD, moderate,generalized anxiety disorder. Long h/o bulimia with purging, stated in high school. Had trial of risperidone, topiramate in past and had no benefit. H/o self mutilating behavior till age 26. Symptoms are occur daily.  Client has reported that she has improved and now her therapist is helping to cut down on binging and purging and now she is doing x 2 per week. Reported limit setting is benefiting. Reported guilty feeling after purging. Reported no benefit with increase dose of abilify. Reported has interest, is active and started running. Reported severe anxiety still persists and wories a lot. Feels work and home is stressful. No support from spouse. 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 Plan to adjunct with buspar to target anxiety. Decrease the dose of abilify as no benefit. Client is continuing  therapy to twice a week. Discussed importance of DBT and CBT in her treatment plan and continuing it. She is currently on diet plan and exchange diet and is working with dietician and meets once a week. She has been under therapy and dietician for one year and feels that it's helping. Patient denies SI/HI/SIB. No evidence of AH/VH or delusions. Risk Scoring- chronic illnesses and prescription drug management    Treatment Plan:  1. Medications:          Medication Changes/Adjustments: decrease abilify to 20 mg daily                                                               Continue Lexapro 30 mg daily                                                               Begin Buspar 10 mg TID with meals      Current Outpatient Prescriptions   Medication Sig Dispense Refill    ARIPiprazole (ABILIFY) 15 mg tablet Take 1 Tab by mouth daily. 30 Tab 0    busPIRone (BUSPAR) 10 mg tablet Please take 1 tablet two times a day for 1 week and then take three times a day with meals 90 Tab 0    escitalopram oxalate (LEXAPRO) 20 mg tablet Take 1.5 Tabs by mouth daily. 39 Tab 0                  The following regarding medications was addressed:    (The risks and benefits of the proposed medication; the potential medication side effects ie    dry mouth, weight gain, GI upset, headache; patient given opportunity to ask questions).  Serotonin syndrome is characterized by neuromuscular hyperreactivity (tremor, hyperreflexia, myoclonus),Hyperthermia, altered mental status, while NMS involves sluggish neuromuscular responses (rigidity, bradyreflexia). Hyperreflexia and myoclonus are rare in NMS          2. Counseling and coordination of care including instructions for treatment, risks/benefits, risk factor reduction and patient/family education. She agrees with the plan. Patient instructed to call with any side effects, questions or issues. Instructed patient to call the clinic, and if after hours call the provider on call ifclient experiences any suicidal thought or ideas to hurt self or other. Also instructed to call 911 or go to the ED. Patient verbalized understanding and agreed to call    3. Follow-up Disposition:  Return in about 4 weeks (around 8/29/2017) for med check and follow up. 4. Other: Nutritional/health counseling on diet and exercise. For reliable dietary information, go to www. EATRIGHT.org. PSYCHOTHERAPY:  approx 16 minutes  Type:  Supportive/Cognitive Behavioral psychotherapy provided  Focus:     Current problems- variable purging and eating. Occupational issues- anxious in social setting, home and work. Interpersonal conflicts- spouse  Psychoeducation provided  Treatment plan reviewed with patient-including diagnosis and medications    Alexsandra is partially progressing.     John Saldaña NP  8/1/2017

## 2017-08-30 DIAGNOSIS — F40.10 SOCIAL ANXIETY DISORDER: ICD-10-CM

## 2017-08-30 DIAGNOSIS — F32.A ANXIETY AND DEPRESSION: ICD-10-CM

## 2017-08-30 DIAGNOSIS — F41.9 ANXIETY AND DEPRESSION: ICD-10-CM

## 2017-08-30 DIAGNOSIS — F50.2 BULIMIA NERVOSA, PURGING TYPE: ICD-10-CM

## 2017-08-30 DIAGNOSIS — F50.2 BULIMIA NERVOSA, MODERATE: ICD-10-CM

## 2017-08-30 DIAGNOSIS — F50.2 BULIMIA NERVOSA, SEVERE: ICD-10-CM

## 2017-08-30 RX ORDER — BUSPIRONE HYDROCHLORIDE 10 MG/1
TABLET ORAL
Qty: 30 TAB | Refills: 0 | Status: SHIPPED | OUTPATIENT
Start: 2017-08-30 | End: 2017-12-15

## 2017-08-30 RX ORDER — ESCITALOPRAM OXALATE 20 MG/1
TABLET ORAL
Qty: 45 TAB | Refills: 0 | OUTPATIENT
Start: 2017-08-30

## 2017-08-30 RX ORDER — ESCITALOPRAM OXALATE 20 MG/1
30 TABLET ORAL DAILY
Qty: 15 TAB | Refills: 0 | Status: SHIPPED | OUTPATIENT
Start: 2017-08-30 | End: 2017-12-15 | Stop reason: SDUPTHER

## 2017-08-30 RX ORDER — ARIPIPRAZOLE 15 MG/1
TABLET ORAL
Qty: 30 TAB | Refills: 0 | OUTPATIENT
Start: 2017-08-30

## 2017-08-30 RX ORDER — ARIPIPRAZOLE 15 MG/1
15 TABLET ORAL DAILY
Qty: 15 TAB | Refills: 0 | Status: SHIPPED | OUTPATIENT
Start: 2017-08-30 | End: 2017-12-15

## 2017-09-19 DIAGNOSIS — F50.2 BULIMIA NERVOSA, PURGING TYPE: ICD-10-CM

## 2017-09-19 DIAGNOSIS — F50.2 BULIMIA NERVOSA, MODERATE: ICD-10-CM

## 2017-09-19 DIAGNOSIS — F41.9 ANXIETY AND DEPRESSION: ICD-10-CM

## 2017-09-19 DIAGNOSIS — F40.10 SOCIAL ANXIETY DISORDER: ICD-10-CM

## 2017-09-19 DIAGNOSIS — F32.A ANXIETY AND DEPRESSION: ICD-10-CM

## 2017-09-19 DIAGNOSIS — F50.2 BULIMIA NERVOSA, SEVERE: ICD-10-CM

## 2017-09-19 RX ORDER — ESCITALOPRAM OXALATE 20 MG/1
TABLET ORAL
Qty: 15 TAB | Refills: 0 | OUTPATIENT
Start: 2017-09-19

## 2017-10-04 ENCOUNTER — TELEPHONE (OUTPATIENT)
Dept: BEHAVIORAL/MENTAL HEALTH CLINIC | Age: 37
End: 2017-10-04

## 2017-10-04 NOTE — TELEPHONE ENCOUNTER
MsWilfred Regulo Arellano was admitted to [facility] for an eating disorder yesterday and her therapist wanted to leave her information in case you wanted to get in contact with her and discuss the pt's condition.

## 2017-10-04 NOTE — TELEPHONE ENCOUNTER
Client's therapist Ben Christian [phone], informed that she admitted  in [facility] for eating ds. disorder.

## 2017-12-15 ENCOUNTER — OFFICE VISIT (OUTPATIENT)
Dept: BEHAVIORAL/MENTAL HEALTH CLINIC | Age: 37
End: 2017-12-15

## 2017-12-15 VITALS
DIASTOLIC BLOOD PRESSURE: 76 MMHG | HEART RATE: 75 BPM | SYSTOLIC BLOOD PRESSURE: 110 MMHG | OXYGEN SATURATION: 97 % | WEIGHT: 285 LBS

## 2017-12-15 DIAGNOSIS — F40.10 SOCIAL ANXIETY DISORDER: ICD-10-CM

## 2017-12-15 DIAGNOSIS — F50.2 BULIMIA NERVOSA, PURGING TYPE: ICD-10-CM

## 2017-12-15 DIAGNOSIS — F41.9 ANXIETY AND DEPRESSION: ICD-10-CM

## 2017-12-15 DIAGNOSIS — F50.2 BULIMIA NERVOSA, SEVERE: Primary | ICD-10-CM

## 2017-12-15 DIAGNOSIS — F32.A ANXIETY AND DEPRESSION: ICD-10-CM

## 2017-12-15 RX ORDER — ESCITALOPRAM OXALATE 20 MG/1
30 TABLET ORAL DAILY
Qty: 45 TAB | Refills: 1 | Status: SHIPPED | OUTPATIENT
Start: 2017-12-15 | End: 2020-10-30

## 2017-12-15 RX ORDER — CARIPRAZINE 3 MG/1
3 CAPSULE, GELATIN COATED ORAL DAILY
Qty: 30 CAP | Refills: 1 | Status: SHIPPED | OUTPATIENT
Start: 2017-12-15 | End: 2020-10-30 | Stop reason: ALTCHOICE

## 2017-12-15 RX ORDER — CARIPRAZINE 3 MG/1
3 CAPSULE, GELATIN COATED ORAL DAILY
COMMUNITY
Start: 2017-11-06 | End: 2017-12-15 | Stop reason: SDUPTHER

## 2017-12-15 NOTE — PROGRESS NOTES
Psychiatric Outpatient Progress Note    Account Number:  [de-identified]  Name: Km Jaiver    SUBJECTIVE:   CHIEF COMPLAINT:  Km Javier is a 40 y.o. female and was seen today for follow-up of psychiatric condition and psychotropic medication management. HPI:    Lashonda Pickett reports the following psychiatric symptoms:  bulimia nervosa, MDD, moderate,generalized anxiety disorder. Long h/o bulimia with purging. H/o anorexia nervosa,  H/o Bulimia and purging x 21 years. The symptoms have been present for many years and are of high severity. The symptoms of anxiety has improved. Reported now binging and purging  x 2 per month. Client reported sleeping for 8 hrs and denied any difficulty initiating and maintaining sleep. Reported appetite is fair, feels hungry at times, following meal plan . Reported energy level is good, has difficulty focus and concentration at work. Has variable motivation, feels hopeless and guilty  r/t binging and purging. Denied any passive suicide thoughts. Worries about making mistakes at work.      Contributing factors include: stressful job, more conscious at work. No support from spouse. Patient denies SI/HI/SIB. Side Effects:  none      Fam/Soc Hx (from [place] with updates):    Family History   Problem Relation Age of Onset    Cancer Maternal Grandmother     Heart Disease Maternal Grandfather       Social History   Substance Use Topics    Smoking status: Never Smoker    Smokeless tobacco: Never Used    Alcohol use Yes      Comment: socially       REVIEW OF SYSTEMS:  Psychiatric:  depression, anxiety.   Appetite:no change from normal   Sleep: no change                    Mental Status exam: WNL except for      Sensorium  oriented to time, place and person   Relations cooperative    Eye Contact    appropriate   Appearance:  age appropriate, casually dressed, within normal Limits and obese   Motor Behavior/Gait:  gait stable and within normal limits   Speech:  normal pitch and normal volume   Thought Process: goal directed, logical and within normal limits   Thought Content free of delusions and free of hallucinations   Suicidal ideations no plan , no intention and none   Homicidal ideations no plan , no intention and none   Mood:  anxious and depressed   Affect:  anxious and depressed   Memory recent  adequate   Memory remote:  adequate   Concentration:  adequate   Abstraction:  abstract   Insight:  fair   Reliability fair   Judgment:  fair       MEDICAL DECISION MAKING  Data: pertinent labs, imaging, medical records and diagnostic tests reviewed and incorporated in diagnosis and treatment plan    No Known Allergies     Current Outpatient Prescriptions   Medication Sig Dispense Refill    escitalopram oxalate (LEXAPRO) 20 mg tablet Take 1.5 Tabs by mouth daily. 45 Tab 1    VRAYLAR 3 mg capsule Take 1 Cap by mouth daily. 30 Cap 1        Visit Vitals    /76 (BP 1 Location: Left arm, BP Patient Position: Sitting)    Pulse 75    Wt 129.3 kg (285 lb)    LMP 12/07/2017  Comment: has Copper IUD    SpO2 97%         Problems addressed today:  Bulimia nervosa purging type, major depressive disorder , moderate , generalized anxiety disorder, Borderline personality disorder. Assessment:   Misti Kirkland  is a 40 y.o.  obese female  is not responding to treatment. Long h/o bulimia nervosa, MDD, moderate,generalized anxiety disorder. Long h/o bulimia with purging, stated in high school. Had trial of risperidone, topiramate in past and had no benefit. H/o self mutilating behavior till age 26. She got recently discharged from residential treatment for eating disorder from 7 weeks from Oct and discharged on Nov 21, 2017. She was stated on [facility] and she feels symptoms has improved. Symptoms occurs rarely. Client reported that her symptoms has improved and is has reduced binging and purging x 2 in one month. Reported feels urges and obsessive thoughts have decrease considerably. Reported guilty feeling after purging. She is in intense out patient therapy x 3 per week for eating disorder at [facility]. Reported has interest, is physically active. Reported has anxiety and is milder and able to cope with it. Feels work and home is stressful and felt transitioning was difficult. 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 Plan to continue treatment plan as it is benefiting her. Client is continuing out patient therapy twice a week. Discussed importance of DBT, ACT and CBT in her treatment plan and continuing it. She is currently on diet plan and exchange diet and is working with dietician and meets once a week. She has been under therapy and dietician for one year and feels that it's helping. Patient denies SI/HI/SIB. No evidence of AH/VH or delusions. Risk Scoring- chronic illnesses and prescription drug management. Possible organic causes contributing are:obesity  Reviewed medical admissions and discussed with the patient. Client is medically stable. Vitals stable  Treatment Plan:  1. Medications:          Medication Changes/Adjustments:  Discontinue  abilify to 20 mg daily                                                                Continue vrylar 3 mg HS                                                              Continue Lexapro 30 mg daily                                                              Discontinue Buspar 10 mg TID with meals      Current Outpatient Prescriptions   Medication Sig Dispense Refill    escitalopram oxalate (LEXAPRO) 20 mg tablet Take 1.5 Tabs by mouth daily. 45 Tab 1    VRAYLAR 3 mg capsule Take 1 Cap by mouth daily. 27 Cap 1                  The following regarding medications was addressed:    (The risks and benefits of the proposed medication; the potential medication side effects ie    dry mouth, weight gain, GI upset, headache; patient given opportunity to ask questions).  Serotonin syndrome is characterized by neuromuscular hyperreactivity (tremor, hyperreflexia, myoclonus),Hyperthermia, altered mental status, while NMS involves sluggish neuromuscular responses (rigidity, bradyreflexia). Hyperreflexia and myoclonus are rare in NMS          2. Counseling and coordination of care including instructions for treatment, risks/benefits, risk factor reduction and patient/family education. She agrees with the plan. Patient instructed to call with any side effects, questions or issues. Instructed patient to call the clinic, and if after hours call the provider on call ifclient experiences any suicidal thought or ideas to hurt self or other. Also instructed to call 911 or go to the ED. Patient verbalized understanding and agreed to call    3. Follow-up Disposition:  Return in about 2 months (around 2/15/2018) for med check and follow up. 4. Other: Nutritional/health counseling on diet and exercise. For reliable dietary information, go to www. EATRIGHT.org. PSYCHOTHERAPY:  approx 5-7 minutes  Type:  Supportive/Cognitive Behavioral psychotherapy provided  Focus:     Current problems- variable purging and eating. Occupational issues- anxious in social setting, home and work. Interpersonal conflicts- spouse  Psychoeducation provided  Treatment plan reviewed with patient-including diagnosis and medications    Alexsandra is  progressing.     Oniel Maurice NP  12/15/2017

## 2017-12-15 NOTE — PATIENT INSTRUCTIONS
Sleep Tips    What to avoid    · Do not have drinks with caffeine, such as coffee or black tea, for 8 hours before bed. · Do not smoke or use other types of tobacco near bedtime. Nicotine is a stimulant and can keep you awake. · Avoid drinking alcohol late in the evening, because it can cause you to wake in the middle of the night. · Do not eat a big meal close to bedtime. If you are hungry, eat a light snack. · Do not drink a lot of water close to bedtime, because the need to urinate may wake you up during the night. · Do not read or watch TV in bed. Use the bed only for sleeping and sexual activity. What to try    · Go to bed at the same time every night, and wake up at the same time every morning. Do not take naps during the day. · Keep your bedroom quiet, dark, and cool. · Get regular exercise, but not within 3 to 4 hours of your bedtime. · Sleep on a comfortable pillow and mattress. · If watching the clock makes you anxious, turn it facing away from you so you cannot see the time. · If you worry when you lie down, start a worry book. Well before bedtime, write down your worries, and then set the book and your concerns aside. · Try meditation or other relaxation techniques before you go to bed. · If you cannot fall asleep, get up and go to another room until you feel sleepy. Do something relaxing. Repeat your bedtime routine before you go to bed again. Make your house quiet and calm about an hour before bedtime. Turn down the lights, turn off the TV, log off the computer, and turn down the volume on music. This can help you relax after a busy day. Bulimia: Care Instructions  Your Care Instructions    Bulimia nervosa is an eating disorder. People with bulimia are very concerned about body shape and size and are afraid of gaining weight. They may crave food and find ways to eat a lot of it fast. This binge eating is often set off by stress or an emotional upset.  After overeating, people with bulimia may feel guilty, uncomfortable, or ashamed. They may vomit, use laxatives, or exercise excessively to get rid of the food they ate. Counseling to understand the condition and to learn ways to reduce stress is a big part of treatment for bulimia. Nutritional counseling can help you learn how to eat a healthy diet. It may help to have your family take part in family counseling so that they can support you. Treatment with medicines such as antidepressants also can help. Follow-up care is a key part of your treatment and safety. Be sure to make and go to all appointments, and call your doctor if you are having problems. It's also a good idea to know your test results and keep a list of the medicines you take. How can you care for yourself at home? Follow your treatment plan  Take your medicines exactly as prescribed. Call your doctor if you think you are having a problem with your medicine. Go to your counseling sessions. Call or talk with your counselor if you cannot attend or you think the sessions are not helping. Do not just stop going. Learn to be easier on yourself  Remember that feeling bad about yourself and not having hope is part of your condition. As you work with your doctor and counselors, you will start to feel better about yourself. Make a list of things you have learned, things you have done that were hard for you, or things you have changed about yourself. Do not blame yourself for having bulimia. Just work on getting better. Learn to accept help. Remember that your goal is to feel better each day. Take good care of yourself  Get enough sleep. Keep your room dark and quiet, and try to go to bed at the same time every night. Try to lower your stress. Manage your time, build a strong system of social support, and lead a healthy lifestyle. To lower your stress, try physical activity, slow deep breathing, relaxing your muscles, or getting a massage.   Do not use alcohol or illegal drugs. Learn the early signs of sudden, urgent food cravings. Eat a healthy, balanced diet. A balanced diet includes whole grains, dairy, fruits and vegetables, and protein. Eat a variety of foods from each of these groups so that you get all the nutrients you need. When should you call for help? Call 911 anytime you think you may need emergency care. For example, call if:  ? You feel hopeless or have thoughts of hurting yourself. ? You cough up blood. ? You vomit blood or what looks like coffee grounds. ? You pass reddish brown or very bloody stools. ?Call your doctor now or seek immediate medical care if:  ? You have pain in your belly. ? You have an irregular heartbeat.   ? You feel dizzy or faint. ? Watch closely for changes in your health, and be sure to contact your doctor if you have any problems. Where can you learn more? Go to http://ricky-jennie.info/. Enter M126 in the search box to learn more about \"Bulimia: Care Instructions. \"  Current as of: May 12, 2017  Content Version: 11.4  © 9599-9453 [employer]. Care instructions adapted under license by [employer] (which disclaims liability or warranty for this information). If you have questions about a medical condition or this instruction, always ask your healthcare professional. [place] [address] any warranty or liability for your use of this information.   ·

## 2017-12-15 NOTE — MR AVS SNAPSHOT
Visit Information Date & Time Provider Department Dept. Phone Encounter #  
 12/15/2017 11:30 AM Garo Maciel, [address] [facility] [phone] [id number] Follow-up Instructions Return in about 2 months (around 2/15/2018) for med check and follow up. Upcoming Health Maintenance Date Due DTaP/Tdap/Td series (1 - Tdap) 4/13/2002 PAP AKA CERVICAL CYTOLOGY 4/13/2002 Influenza Age 5 to Adult 8/1/2017 Allergies as of 12/15/2017  Review Complete On: 12/15/2017 By: Rima Doll CNA No Known Allergies Current Immunizations  Never Reviewed No immunizations on file. Not reviewed this visit You Were Diagnosed With   
  
 Codes Comments Bulimia nervosa, severe    -  Primary ICD-10-CM: F50.2 ICD-9-CM: 307.51 Bulimia nervosa, purging type     ICD-10-CM: F50.2 ICD-9-CM: 307.51 Anxiety and depression     ICD-10-CM: F41.8 ICD-9-CM: 300.00, 311 Social anxiety disorder     ICD-10-CM: F40.10 ICD-9-CM: 300.23 Vitals BP Pulse Weight(growth percentile) LMP SpO2 Smoking Status 110/76 (BP 1 Location: Left arm, BP Patient Position: Sitting) 75 285 lb (129.3 kg) 12/07/2017 97% Never Smoker Vitals History Preferred Pharmacy Pharmacy Name Phone [facility] [id number] [place] [address] [phone] Your Updated Medication List  
  
   
This list is accurate as of: 12/15/17 11:54 AM.  Always use your most recent med list.  
  
  
  
  
 escitalopram oxalate 20 mg tablet Commonly known as:  Georgiann Bares Take 1.5 Tabs by mouth daily. VRAYLAR 3 mg capsule Generic drug:  cariprazine Take 1 Cap by mouth daily. Prescriptions Sent to Pharmacy Refills  
 escitalopram oxalate (LEXAPRO) 20 mg tablet 1 Sig: Take 1.5 Tabs by mouth daily. Class: Normal  
 Pharmacy: [facility] [id number] [place] [address] Ph #: [phone]  Route: Oral  
 VRAYLAR 3 mg capsule 1 Sig: Take 1 Cap by mouth daily. Class: Normal  
 Pharmacy: [facility] [id number] [place] [address].  #: [phone] Route: Oral  
  
Follow-up Instructions Return in about 2 months (around 2/15/2018) for med check and follow up. Patient Instructions Sleep Tips What to avoid   
· Do not have drinks with caffeine, such as coffee or black tea, for 8 hours before bed. · Do not smoke or use other types of tobacco near bedtime. Nicotine is a stimulant and can keep you awake. · Avoid drinking alcohol late in the evening, because it can cause you to wake in the middle of the night. · Do not eat a big meal close to bedtime. If you are hungry, eat a light snack. · Do not drink a lot of water close to bedtime, because the need to urinate may wake you up during the night. · Do not read or watch TV in bed. Use the bed only for sleeping and sexual activity. What to try   
· Go to bed at the same time every night, and wake up at the same time every morning. Do not take naps during the day. · Keep your bedroom quiet, dark, and cool. · Get regular exercise, but not within 3 to 4 hours of your bedtime. · Sleep on a comfortable pillow and mattress. · If watching the clock makes you anxious, turn it facing away from you so you cannot see the time. · If you worry when you lie down, start a worry book. Well before bedtime, write down your worries, and then set the book and your concerns aside. · Try meditation or other relaxation techniques before you go to bed. · If you cannot fall asleep, get up and go to another room until you feel sleepy. Do something relaxing. Repeat your bedtime routine before you go to bed again. Make your house quiet and calm about an hour before bedtime. Turn down the lights, turn off the TV, log off the computer, and turn down the volume on music. This can help you relax after a busy day. Bulimia: Care Instructions Your Care Instructions Bulimia nervosa is an eating disorder. People with bulimia are very concerned about body shape and size and are afraid of gaining weight. They may crave food and find ways to eat a lot of it fast. This binge eating is often set off by stress or an emotional upset. After overeating, people with bulimia may feel guilty, uncomfortable, or ashamed. They may vomit, use laxatives, or exercise excessively to get rid of the food they ate. Counseling to understand the condition and to learn ways to reduce stress is a big part of treatment for bulimia. Nutritional counseling can help you learn how to eat a healthy diet. It may help to have your family take part in family counseling so that they can support you. Treatment with medicines such as antidepressants also can help. Follow-up care is a key part of your treatment and safety. Be sure to make and go to all appointments, and call your doctor if you are having problems. It's also a good idea to know your test results and keep a list of the medicines you take. How can you care for yourself at home? Follow your treatment plan Take your medicines exactly as prescribed. Call your doctor if you think you are having a problem with your medicine. Go to your counseling sessions. Call or talk with your counselor if you cannot attend or you think the sessions are not helping. Do not just stop going. Learn to be easier on yourself Remember that feeling bad about yourself and not having hope is part of your condition. As you work with your doctor and counselors, you will start to feel better about yourself. Make a list of things you have learned, things you have done that were hard for you, or things you have changed about yourself. Do not blame yourself for having bulimia. Just work on getting better. Learn to accept help. Remember that your goal is to feel better each day. Take good care of yourself Get enough sleep. Keep your room dark and quiet, and try to go to bed at the same time every night. Try to lower your stress. Manage your time, build a strong system of social support, and lead a healthy lifestyle. To lower your stress, try physical activity, slow deep breathing, relaxing your muscles, or getting a massage. Do not use alcohol or illegal drugs. Learn the early signs of sudden, urgent food cravings. Eat a healthy, balanced diet. A balanced diet includes whole grains, dairy, fruits and vegetables, and protein. Eat a variety of foods from each of these groups so that you get all the nutrients you need. When should you call for help? Call 911 anytime you think you may need emergency care. For example, call if: 
? You feel hopeless or have thoughts of hurting yourself. ? You cough up blood. ? You vomit blood or what looks like coffee grounds. ? You pass reddish brown or very bloody stools. ?Call your doctor now or seek immediate medical care if: 
? You have pain in your belly. ? You have an irregular heartbeat.  
? You feel dizzy or faint. ? Watch closely for changes in your health, and be sure to contact your doctor if you have any problems. Where can you learn more? Go to http://ricky-jennie.info/. Enter O367 in the search box to learn more about \"Bulimia: Care Instructions. \" Current as of: May 12, 2017 Content Version: 11.4 © 7351-8673 [place]. Care instructions adapted under license by [employer] (which disclaims liability or warranty for this information). If you have questions about a medical condition or this instruction, always ask your healthcare professional. [address] any warranty or liability for your use of this information. · Introducing [facility]!    
 [facility] introduces [employer] patient portal. Now you can access parts of your medical record, email your doctor's office, and request medication refills online. 1. In your internet browser, go to [email or web address] 2. Click on the First Time User? Click Here link in the Sign In box. You will see the New Member Sign Up page. 3. Enter your [employer] Access Code exactly as it appears below. You will not need to use this code after youve completed the sign-up process. If you do not sign up before the expiration date, you must request a new code. · [employer] Access Code: [email or web address] Expires: 3/15/2018 11:47 AM 
 
4. Enter the last four digits of your Social Security Number (xxxx) and Date of Birth (mm/dd/yyyy) as indicated and click Submit. You will be taken to the next sign-up page. 5. Create a [employer] ID. This will be your [employer] login ID and cannot be changed, so think of one that is secure and easy to remember. 6. Create a [employer] password. You can change your password at any time. 7. Enter your Password Reset Question and Answer. This can be used at a later time if you forget your password. 8. Enter your e-mail address. You will receive e-mail notification when new information is available in [address] 9. Click Sign Up. You can now view and download portions of your medical record. 10. Click the Download Summary menu link to download a portable copy of your medical information. If you have questions, please visit the Frequently Asked Questions section of the [employer] website. Remember, [employer] is NOT to be used for urgent needs. For medical emergencies, dial 911. Now available from your iPhone and Android! Please provide this summary of care documentation to your next provider. Your primary care clinician is listed as Nay Newman. If you have any questions after today's visit, please call [phone].

## 2018-02-15 ENCOUNTER — DOCUMENTATION ONLY (OUTPATIENT)
Dept: BEHAVIORAL/MENTAL HEALTH CLINIC | Age: 38
End: 2018-02-15

## 2018-10-09 ENCOUNTER — HOSPITAL ENCOUNTER (EMERGENCY)
Age: 38
Discharge: HOME | End: 2018-10-09
Attending: EMERGENCY MEDICINE
Payer: COMMERCIAL

## 2018-10-09 ENCOUNTER — APPOINTMENT (OUTPATIENT)
Dept: GENERAL RADIOLOGY | Age: 38
End: 2018-10-09
Attending: EMERGENCY MEDICINE
Payer: COMMERCIAL

## 2018-10-09 VITALS
OXYGEN SATURATION: 97 % | HEART RATE: 75 BPM | HEIGHT: 69 IN | SYSTOLIC BLOOD PRESSURE: 128 MMHG | WEIGHT: 269.18 LBS | TEMPERATURE: 98.1 F | RESPIRATION RATE: 20 BRPM | BODY MASS INDEX: 39.87 KG/M2 | DIASTOLIC BLOOD PRESSURE: 80 MMHG

## 2018-10-09 DIAGNOSIS — S81.811A LACERATION OF RIGHT LOWER EXTREMITY, INITIAL ENCOUNTER: Primary | ICD-10-CM

## 2018-10-09 PROCEDURE — 77030031132 HC SUT NYL COVD -A

## 2018-10-09 PROCEDURE — 90471 IMMUNIZATION ADMIN: CPT

## 2018-10-09 PROCEDURE — 77030018836 HC SOL IRR NACL ICUM -A

## 2018-10-09 PROCEDURE — 90715 TDAP VACCINE 7 YRS/> IM: CPT | Performed by: EMERGENCY MEDICINE

## 2018-10-09 PROCEDURE — 74011250636 HC RX REV CODE- 250/636: Performed by: EMERGENCY MEDICINE

## 2018-10-09 PROCEDURE — 99282 EMERGENCY DEPT VISIT SF MDM: CPT

## 2018-10-09 PROCEDURE — 74011000250 HC RX REV CODE- 250: Performed by: EMERGENCY MEDICINE

## 2018-10-09 PROCEDURE — 75810000293 HC SIMP/SUPERF WND  RPR

## 2018-10-09 PROCEDURE — 73562 X-RAY EXAM OF KNEE 3: CPT

## 2018-10-09 RX ORDER — LIDOCAINE HYDROCHLORIDE AND EPINEPHRINE 20; 10 MG/ML; UG/ML
10 INJECTION, SOLUTION INFILTRATION; PERINEURAL ONCE
Status: COMPLETED | OUTPATIENT
Start: 2018-10-09 | End: 2018-10-09

## 2018-10-09 RX ORDER — CEPHALEXIN 500 MG/1
500 CAPSULE ORAL 2 TIMES DAILY
Qty: 14 CAP | Refills: 0 | Status: SHIPPED | OUTPATIENT
Start: 2018-10-09 | End: 2018-10-16

## 2018-10-09 RX ADMIN — TETANUS TOXOID, REDUCED DIPHTHERIA TOXOID AND ACELLULAR PERTUSSIS VACCINE, ADSORBED 0.5 ML: 5; 2.5; 8; 8; 2.5 SUSPENSION INTRAMUSCULAR at 10:13

## 2018-10-09 RX ADMIN — LIDOCAINE HYDROCHLORIDE,EPINEPHRINE BITARTRATE 200 MG: 20; .01 INJECTION, SOLUTION INFILTRATION; PERINEURAL at 10:13

## 2018-10-09 NOTE — DISCHARGE INSTRUCTIONS
Cuts Closed With Stitches: Care Instructions  Your Care Instructions  A cut can happen anywhere on your body. The doctor used stitches to close the cut. Using stitches also helps the cut heal and reduces scarring. Sometimes pieces of tape called Steri-Strips are put over the stitches. If the cut went deep and through the skin, the doctor may have put in two layers of stitches. The deeper layer brings the deep part of the cut together. These stitches will dissolve and don't need to be removed. The stitches in the upper layer are the ones you see on the cut. You will probably have a bandage over the stitches. You will need to have the stitches removed, usually in 7 to 14 days. The doctor has checked you carefully, but problems can develop later. If you notice any problems or new symptoms, get medical treatment right away. Follow-up care is a key part of your treatment and safety. Be sure to make and go to all appointments, and call your doctor if you are having problems. It's also a good idea to know your test results and keep a list of the medicines you take. How can you care for yourself at home? · Keep the cut dry for the first 24 to 48 hours. After this, you can shower if your doctor okays it. Pat the cut dry. · Don't soak the cut, such as in a bathtub. Your doctor will tell you when it's safe to get the cut wet. · If your doctor told you how to care for your cut, follow your doctor's instructions. If you did not get instructions, follow this general advice:  ¨ After the first 24 to 48 hours, wash around the cut with clean water 2 times a day. Don't use hydrogen peroxide or alcohol, which can slow healing. ¨ You may cover the cut with a thin layer of petroleum jelly, such as Vaseline, and a nonstick bandage. ¨ Apply more petroleum jelly and replace the bandage as needed. · Prop up the sore area on a pillow anytime you sit or lie down during the next 3 days.  Try to keep it above the level of your heart. This will help reduce swelling. · Avoid any activity that could cause your cut to reopen. · Do not remove the stitches on your own. Your doctor will tell you when to come back to have the stitches removed. · Leave Steri-Strips on until they fall off. · Be safe with medicines. Read and follow all instructions on the label. ¨ If the doctor gave you a prescription medicine for pain, take it as prescribed. ¨ If you are not taking a prescription pain medicine, ask your doctor if you can take an over-the-counter medicine. When should you call for help? Call your doctor now or seek immediate medical care if:    · You have new pain, or your pain gets worse.     · The skin near the cut is cold or pale or changes color.     · You have tingling, weakness, or numbness near the cut.     · The cut starts to bleed, and blood soaks through the bandage. Oozing small amounts of blood is normal.     · You have trouble moving the area near the cut.     · You have symptoms of infection, such as:  ¨ Increased pain, swelling, warmth, or redness around the cut. ¨ Red streaks leading from the cut. ¨ Pus draining from the cut. ¨ A fever.    Watch closely for changes in your health, and be sure to contact your doctor if:    · The cut reopens.     · You do not get better as expected. Where can you learn more? Go to http://ricky-jennie.info/. Enter R217 in the search box to learn more about \"Cuts Closed With Stitches: Care Instructions. \"  Current as of: November 20, 2017  Content Version: 11.8  © 6837-8863 [employer]. Care instructions adapted under license by [employer] (which disclaims liability or warranty for this information). If you have questions about a medical condition or this instruction, always ask your healthcare professional. [address] any warranty or liability for your use of this information.

## 2018-10-09 NOTE — ED PROVIDER NOTES
HPI Comments: 78-year-old female presents emergency room for evaluation of a laceration to the right leg. Laceration was sustained approximately one hour ago. Patient was chasing a cat 3 in the woods when she tripped and fell and landed on an unknown object. Laceration is noted to the upper portion of the lower leg medial aspect. No numbness or tingling, loss of sensation. Patient is able to bear weight. No knee pain or leg pain. No medicines taken prior to arrival. 
Patient needed a shot. Social hx Nonsmoker Social alcohol Patient is a 41 y.o. female presenting with skin laceration. The history is provided by the patient. Laceration Past Medical History:  
Diagnosis Date  Anxiety  Depression Past Surgical History:  
Procedure Laterality Date  HX  SECTION Family History:  
Problem Relation Age of Onset  Cancer Maternal Grandmother  Heart Disease Maternal Grandfather Social History Social History  Marital status:  Spouse name: N/A  
 Number of children: N/A  
 Years of education: N/A Occupational History  Not on file. Social History Main Topics  Smoking status: Never Smoker  Smokeless tobacco: Never Used  Alcohol use Yes Comment: socially  Drug use: No  
 Sexual activity: Not on file Other Topics Concern  Not on file Social History Narrative ALLERGIES: Review of patient's allergies indicates no known allergies. Review of Systems Constitutional: Negative for chills and fever. HENT: Negative for congestion. Respiratory: Negative for cough and chest tightness. Cardiovascular: Negative for chest pain. Gastrointestinal: Negative for nausea and vomiting. Musculoskeletal: Negative for back pain. Skin: Positive for wound. Negative for color change. Neurological: Negative for dizziness and light-headedness. All other systems reviewed and are negative. Vitals: 10/09/18 3340 BP: 128/80 Pulse: 75 Resp: 20 Temp: 98.1 °F (36.7 °C) SpO2: 97% Weight: 122.1 kg (269 lb 2.9 oz) Height: 5' 9\" (1.753 m) Physical Exam  
Constitutional: She is oriented to person, place, and time. She appears well-developed and well-nourished. No distress. HENT:  
Head: Normocephalic and atraumatic. Eyes: Conjunctivae and EOM are normal. Pupils are equal, round, and reactive to light. Neck: Normal range of motion. Neck supple. Cardiovascular: Normal rate and regular rhythm. Pulmonary/Chest: Effort normal. No respiratory distress. Musculoskeletal: She exhibits no edema or tenderness. Right Knee: no redness, warmth, swelling. No obvious deformity. Medial aspect proximal lower leg 2 cm laceration Pt able to lift leg off of exam bed. Pt able to flex and extend fully. No pain with palpation. No palpable effusion. Joint stable. No ligament laxity with valgus or varus stress. Anterior drawer negative. 2+ dorsalis pedis, 2+ posterior tibialis. Ankle, foot, hip, tib/fib  Non tender to palpation. Neurological: She is alert and oriented to person, place, and time. No cranial nerve deficit. She exhibits normal muscle tone. Coordination normal.  
Skin: Skin is warm and dry. No rash noted. Psychiatric: She has a normal mood and affect. Her behavior is normal. Judgment and thought content normal.  
Nursing note and vitals reviewed. MDM Number of Diagnoses or Management Options Laceration of right lower extremity, initial encounter:  
Diagnosis management comments: 61-year-old female with a laceration to the right leg sustained on an unknown object. X-ray is negative for foreign body or fracture. The wound has been irrigated extensively. I close the wound loosely due to potential for deep tissue foreign body. I explain this to the patient.  Discharged with Keflex and pain medicine follow up PCP 
 
 Patient's results have been reviewed with them. Patient and/or family have verbally conveyed their understanding and agreement of the patient's signs, symptoms, diagnosis, treatment and prognosis and additionally agree to follow up as recommended or return to the Emergency Room should their condition change prior to follow-up. Discharge instructions have also been provided to the patient with some educational information regarding their diagnosis as well a list of reasons why they would want to return to the ER prior to their follow-up appointment should their condition change. Amount and/or Complexity of Data Reviewed Discuss the patient with other providers: yes (ER attending-Gris) Patient Progress Patient progress: stable ED Course Wound Repair 
Date/Time: 10/9/2018 3:44 PM 
Performed by: Fany Flynn provider: Stevan Sanabria Preparation: skin prepped with Betadine, sterile field established and skin prepped with ChloraPrep Pre-procedure re-eval: Immediately prior to the procedure, the patient was reevaluated and found suitable for the planned procedure and any planned medications. Time out: Immediately prior to the procedure a time out was called to verify the correct patient, procedure, equipment, staff and marking as appropriate. Toni Martinez Location details: right leg Wound length:2.5 cm or less Anesthesia: 
Local Anesthetic: lidocaine 2% with epinephrine Anesthetic total: 10 mL Foreign bodies: no foreign bodies Irrigation solution: saline Irrigation method: syringe Debridement: none Skin closure: Prolene Number of sutures: 6 Technique: simple and interrupted Approximation: loose Dressing: 4x4 Patient tolerance: Patient tolerated the procedure well with no immediate complications My total time at bedside, performing this procedure was 16-30 minutes (20). Comments: Wound irrigated extensively with normal saline. Explored. No foreign bodies. Wound loosely closed. Pt case including HPI, PE, and all available lab and radiology results has been discussed with attending physician. Opportunity to evaluate patient has been provided to ER attending. Discharge and prescription plan has been agreed upon.

## 2018-10-09 NOTE — ED TRIAGE NOTES
Triage note: Patient is coming in for laceration to the right knee from falling in the woods. Patient was chasing cat through the woods and feel and something cut her she is unsure what it was.

## 2020-10-30 ENCOUNTER — OFFICE VISIT (OUTPATIENT)
Dept: SURGERY | Age: 40
End: 2020-10-30
Payer: COMMERCIAL

## 2020-10-30 ENCOUNTER — DOCUMENTATION ONLY (OUTPATIENT)
Dept: SURGERY | Age: 40
End: 2020-10-30

## 2020-10-30 VITALS — DIASTOLIC BLOOD PRESSURE: 81 MMHG | HEART RATE: 65 BPM | TEMPERATURE: 98.1 F | SYSTOLIC BLOOD PRESSURE: 128 MMHG

## 2020-10-30 DIAGNOSIS — Q83.8 ECTOPIC BREAST TISSUE: Primary | ICD-10-CM

## 2020-10-30 DIAGNOSIS — Z91.89 AT HIGH RISK FOR BREAST CANCER: ICD-10-CM

## 2020-10-30 DIAGNOSIS — N60.11 FIBROCYSTIC BREAST CHANGES, RIGHT: ICD-10-CM

## 2020-10-30 PROCEDURE — 99203 OFFICE O/P NEW LOW 30 MIN: CPT | Performed by: SURGERY

## 2020-10-30 RX ORDER — GABAPENTIN 100 MG/1
CAPSULE ORAL 3 TIMES DAILY
COMMUNITY

## 2020-10-30 RX ORDER — SPIRONOLACTONE 100 MG/1
TABLET, FILM COATED ORAL DAILY
COMMUNITY

## 2020-10-30 RX ORDER — BUPROPION HYDROCHLORIDE 75 MG/1
300 TABLET ORAL DAILY
COMMUNITY

## 2020-10-30 RX ORDER — LAMOTRIGINE 100 MG/1
100 TABLET ORAL DAILY
COMMUNITY

## 2020-10-30 NOTE — PROGRESS NOTES
HISTORY OF PRESENT ILLNESS  Mirza Wang is a 45 y.o. female. HPI  NEW Patient presents for consultation at the request of Dr. Casie Dela Cruz for painful lump in RIGHT axilla. Symptoms started in July. She had imaging done which was normal. She is also a high risk patient. [facility] calculated her [place] to be 33.1%.     Family history-  Paternal aunt had breast cancer, age unknown. Maternal aunt had breast cancer diagnosed with breast cancer in her 42's. . Paternal grandmother had breast cancer in her 63's. Maternal aunt had breast cancer in her 63's.   The patient had genetic testing done at [address] and tested negative.      Breast imaging-  2020 - mammogram and US done at [address]: BI-RADS 2      Past Medical History:   Diagnosis Date    Anxiety     Depression        Past Surgical History:   Procedure Laterality Date    HX  SECTION         Social History     Socioeconomic History    Marital status: LEGALLY      Spouse name: Not on file    Number of children: Not on file    Years of education: Not on file    Highest education level: Not on file   Occupational History    Not on file   Social Needs    Financial resource strain: Not on file    Food insecurity     Worry: Not on file     Inability: Not on file    Transportation needs     Medical: Not on file     Non-medical: Not on file   Tobacco Use    Smoking status: Never Smoker    Smokeless tobacco: Never Used   Substance and Sexual Activity    Alcohol use: Yes     Comment: socially    Drug use: No    Sexual activity: Not on file   Lifestyle    Physical activity     Days per week: Not on file     Minutes per session: Not on file    Stress: Not on file   Relationships    Social connections     Talks on phone: Not on file     Gets together: Not on file     Attends Rastafari service: Not on file     Active member of club or organization: Not on file     Attends meetings of clubs or organizations: Not on file     Relationship status: Not on file    Intimate partner violence     Fear of current or ex partner: Not on file     Emotionally abused: Not on file     Physically abused: Not on file     Forced sexual activity: Not on file   Other Topics Concern    Not on file   Social History Narrative    Not on file       Current Outpatient Medications on File Prior to Visit   Medication Sig Dispense Refill    buPROPion (WELLBUTRIN) 75 mg tablet Take 300 mg by mouth daily.  lamoTRIgine (LaMICtal) 100 mg tablet Take 100 mg by mouth daily.  gabapentin (NEURONTIN) 100 mg capsule Take  by mouth three (3) times daily.  spironolactone (ALDACTONE) 100 mg tablet Take  by mouth daily.  [DISCONTINUED] escitalopram oxalate (LEXAPRO) 20 mg tablet Take 1.5 Tabs by mouth daily. 45 Tab 1    [DISCONTINUED] VRAYLAR 3 mg capsule Take 1 Cap by mouth daily. 30 Cap 1     No current facility-administered medications on file prior to visit. No Known Allergies    OB History    No obstetric history on file. Obstetric Comments   Menarche:  7. LMP: 10/15/2020. # of Children:  1. Age at Delivery of First Child:  29.   Hysterectomy/oophorectomy:  NO/NO. Breast Bx:  no.  Hx of Breast Feeding:  no. BCP:  yes. Hormone therapy:  no.                  ROS  Constitutional: Negative. HENT: Negative. Eyes: Negative. Respiratory: Negative. Cardiovascular: Negative. Gastrointestinal: Negative. Genitourinary: Negative. Musculoskeletal: Negative. Skin: Negative. Neurological: Negative. Endo/Heme/Allergies: Negative. Psychiatric/Behavioral: Negative. Physical Exam  Exam conducted with a chaperone present. Cardiovascular:      Rate and Rhythm: Normal rate and regular rhythm. Heart sounds: Normal heart sounds. Pulmonary:      Breath sounds: Normal breath sounds.    Chest:      Breasts: Breasts are symmetrical.         Right: Normal. No swelling, bleeding, inverted nipple, mass, nipple discharge, skin change or tenderness. Left: Normal. No swelling, bleeding, inverted nipple, mass, nipple discharge, skin change or tenderness. Lymphadenopathy:      Cervical:      Right cervical: No superficial, deep or posterior cervical adenopathy. Left cervical: No superficial, deep or posterior cervical adenopathy. Upper Body:      Right upper body: No supraclavicular or axillary adenopathy. Left upper body: No supraclavicular or axillary adenopathy. ASSESSMENT and PLAN    ICD-10-CM ICD-9-CM    1. Ectopic breast tissue  Q83.8 757.6    2. Fibrocystic breast changes, right  N60.11 610.1    3. At high risk for breast cancer  Z91.89 V49.89       New patient presents for evaluation of painful RIGHT axillary lump, and is doing well overall. Ectopic breast tissue noted in the RIGHT axilla, exam otherwise normal. Will treat with vitamin E and primrose oil for fibrocystic breast pain. Will order breast MRI based on high risk (33.1% by Shana). Pt qualifies for enrollment in our high risk clinic that includes annual screening breast imaging and follow-up appointments with our nurse practitioner. Pt should have 2 breast exams annually, staggered between here and her PCP or OB-GYN. F/U in 1 year with Baldo Padilla NP. This plan was reviewed with the patient and patient agrees. All questions were answered.     Written by Denver Scudder, as dictated by Dr. Maria C Rios MD.

## 2020-10-30 NOTE — PATIENT INSTRUCTIONS
Learning About Breast Cancer Screening What is breast cancer screening? Breast cancer occurs when cells that are not normal grow in one or both of your breasts. Screening tests can help find breast cancer early. Cancer is easier to treat when it's found early. Having concerns about breast cancer is common. That's why it's important to talk with your doctor about when to start and how often to get screened for breast cancer. How is breast cancer screening done? Several screening tests can be used to check for breast cancer. Mammograms. These tests check for signs of cancer using X-rays. They can show tumors that are too small for you or your doctor to feel. During a mammogram, a machine squeezes your breasts to make them flatter and easier to X-ray. At least two pictures are taken of each breast. One is taken from the top and one from the side. 3-D mammograms. These tests are also called digital breast tomosynthesis. Your breast is positioned on a flat plate. A top plate is pressed against your breast to keep it in position. The X-ray arm then moves in an arc above the breast and takes many pictures. A computer uses these X-rays to create a three-dimensional image. Clinical breast exam.  
In this exam, your doctor carefully feels your breasts and under your arms to check for lumps or other changes. Who should be screened for breast cancer? Experts agree that mammograms are the best screening test for people at average risk of breast cancer. But they don't all agree on the age at which screening should start. And they don't agree on whether it's better to be screened every year or every two years. Here are some of the recommendations from experts: 
· Start by age 36 and have a mammogram each year. · Start at age 39 and have a mammogram each year. · Start at age 48 and have a mammogram every 2 years. When to stop having mammograms is another decision.  You and your doctor can decide on the right age to start and stop screening based on your personal preferences and overall health. What is your risk for breast cancer? If you don't already know your risk of breast cancer, you can ask your doctor about it. You can also look it up at www.cancer.gov/bcrisktool/. If your doctor says that you have a high or very high risk, ask about ways to reduce your risk. These could include getting extra screening, taking medicine, or having surgery. If you have a strong family history of breast cancer, ask your doctor about genetic testing. What steps can you take to stay healthy? Some things that increase your risk of breast cancer, such as your age and being female, cannot be controlled. But you can do some things to stay as healthy as you can. · Learn what your breasts normally look and feel like. If you notice any changes, tell your doctor. · If you drink alcohol, limit how much you drink. Any amount of alcohol may increase your risk for some types of cancer. · If you smoke, quit. When you quit smoking, you lower your chances of getting many types of cancer. You can also do your best to eat well, be active, and stay at a healthy weight. Eating healthy foods and being active every day, as well as staying at a healthy weight, may help prevent cancer. Where can you learn more? Go to http://www.gray.com/ Enter C409 in the search box to learn more about \"Learning About Breast Cancer Screening. \" Current as of: April 29, 2020               Content Version: 12.6 © 2006-2020 [employer]. Care instructions adapted under license by [employer] (which disclaims liability or warranty for this information). If you have questions about a medical condition or this instruction, always ask your healthcare professional. [address] any warranty or liability for your use of this information.

## 2020-10-30 NOTE — PROGRESS NOTES
Outside mammogram brought up to [facility] to be uploaded into pacs, as Dr. Nehemias Chaney is going to order a breast mri for high risk screening.

## 2020-10-30 NOTE — LETTER
10/30/2020 10:30 AM 
 
Patient:  Kirti Hutchison YOB: 1980 Date of Visit: 10/30/2020 Dear Dr. Delfina Art: Thank you for referring Ms. Kirti Hutchison to me for evaluation/treatment. Below are the relevant portions of my assessment and plan of care. If you have questions, please do not hesitate to call me. I look forward to following Ms. Peres along with you.  
 
 
 
Sincerely, 
 
 
Elly Osorio MD

## 2021-07-26 ENCOUNTER — HOSPITAL ENCOUNTER (OUTPATIENT)
Dept: MRI IMAGING | Age: 41
Discharge: HOME | End: 2021-07-26
Attending: SURGERY
Payer: COMMERCIAL

## 2021-07-26 ENCOUNTER — TELEPHONE (OUTPATIENT)
Dept: SURGERY | Age: 41
End: 2021-07-26

## 2021-07-26 VITALS — BODY MASS INDEX: 36.92 KG/M2 | WEIGHT: 250 LBS

## 2021-07-26 DIAGNOSIS — Z91.89 AT HIGH RISK FOR BREAST CANCER: ICD-10-CM

## 2021-07-26 PROCEDURE — A9585 GADOBUTROL INJECTION: HCPCS | Performed by: SURGERY

## 2021-07-26 PROCEDURE — 77049 MRI BREAST C-+ W/CAD BI: CPT

## 2021-07-26 PROCEDURE — 74011250636 HC RX REV CODE- 250/636: Performed by: SURGERY

## 2021-07-26 RX ADMIN — GADOBUTROL 10 ML: 604.72 INJECTION INTRAVENOUS at 09:43

## 2024-09-10 ENCOUNTER — APPOINTMENT (OUTPATIENT)
Facility: HOSPITAL | Age: 44
End: 2024-09-10
Payer: COMMERCIAL

## 2024-09-10 ENCOUNTER — HOSPITAL ENCOUNTER (EMERGENCY)
Facility: HOSPITAL | Age: 44
Discharge: HOME | End: 2024-09-10
Attending: EMERGENCY MEDICINE
Payer: COMMERCIAL

## 2024-09-10 VITALS
TEMPERATURE: 98.1 F | HEART RATE: 88 BPM | WEIGHT: 275.13 LBS | BODY MASS INDEX: 40.75 KG/M2 | DIASTOLIC BLOOD PRESSURE: 87 MMHG | RESPIRATION RATE: 18 BRPM | HEIGHT: 69 IN | SYSTOLIC BLOOD PRESSURE: 128 MMHG | OXYGEN SATURATION: 96 %

## 2024-09-10 DIAGNOSIS — R07.9 CHEST PAIN, UNSPECIFIED TYPE: Primary | ICD-10-CM

## 2024-09-10 DIAGNOSIS — R06.00 DYSPNEA, UNSPECIFIED TYPE: ICD-10-CM

## 2024-09-10 LAB
ALBUMIN SERPL-MCNC: 3.8 G/DL (ref 3.5–5)
ALBUMIN/GLOB SERPL: 1.1 (ref 1.1–2.2)
ALP SERPL-CCNC: 69 U/L (ref 45–117)
ALT SERPL-CCNC: 30 U/L (ref 12–78)
ANION GAP SERPL CALC-SCNC: 3 MMOL/L (ref 2–12)
APPEARANCE UR: CLEAR
AST SERPL-CCNC: 16 U/L (ref 15–37)
BACTERIA URNS QL MICRO: NEGATIVE /HPF
BASOPHILS # BLD: 0.1 K/UL (ref 0–0.1)
BASOPHILS NFR BLD: 1 % (ref 0–1)
BILIRUB SERPL-MCNC: 0.7 MG/DL (ref 0.2–1)
BILIRUB UR QL: NEGATIVE
BUN SERPL-MCNC: 8 MG/DL (ref 6–20)
BUN/CREAT SERPL: 8 (ref 12–20)
CALCIUM SERPL-MCNC: 8.9 MG/DL (ref 8.5–10.1)
CHLORIDE SERPL-SCNC: 106 MMOL/L (ref 97–108)
CO2 SERPL-SCNC: 27 MMOL/L (ref 21–32)
COLOR UR: ABNORMAL
COMMENT:: NORMAL
CREAT SERPL-MCNC: 0.97 MG/DL (ref 0.55–1.02)
D DIMER PPP FEU-MCNC: 0.44 MG/L FEU (ref 0–0.65)
DIFFERENTIAL METHOD BLD: NORMAL
EOSINOPHIL # BLD: 0.3 K/UL (ref 0–0.4)
EOSINOPHIL NFR BLD: 4 % (ref 0–7)
EPITH CASTS URNS QL MICRO: ABNORMAL /LPF
ERYTHROCYTE [DISTWIDTH] IN BLOOD BY AUTOMATED COUNT: 12.9 % (ref 11.5–14.5)
GLOBULIN SER CALC-MCNC: 3.4 G/DL (ref 2–4)
GLUCOSE SERPL-MCNC: 109 MG/DL (ref 65–100)
GLUCOSE UR STRIP.AUTO-MCNC: NEGATIVE MG/DL
HCG UR QL: NEGATIVE
HCT VFR BLD AUTO: 43.9 % (ref 35–47)
HGB BLD-MCNC: 14.9 G/DL (ref 11.5–16)
HGB UR QL STRIP: NEGATIVE
HYALINE CASTS URNS QL MICRO: ABNORMAL /LPF (ref 0–5)
IMM GRANULOCYTES # BLD AUTO: 0 K/UL (ref 0–0.04)
IMM GRANULOCYTES NFR BLD AUTO: 0 % (ref 0–0.5)
KETONES UR QL STRIP.AUTO: NEGATIVE MG/DL
LEUKOCYTE ESTERASE UR QL STRIP.AUTO: ABNORMAL
LIPASE SERPL-CCNC: 45 U/L (ref 13–75)
LYMPHOCYTES # BLD: 1.3 K/UL (ref 0.8–3.5)
LYMPHOCYTES NFR BLD: 15 % (ref 12–49)
MCH RBC QN AUTO: 29.5 PG (ref 26–34)
MCHC RBC AUTO-ENTMCNC: 33.9 G/DL (ref 30–36.5)
MCV RBC AUTO: 86.9 FL (ref 80–99)
MONOCYTES # BLD: 0.6 K/UL (ref 0–1)
MONOCYTES NFR BLD: 7 % (ref 5–13)
NEUTS SEG # BLD: 6.1 K/UL (ref 1.8–8)
NEUTS SEG NFR BLD: 73 % (ref 32–75)
NITRITE UR QL STRIP.AUTO: NEGATIVE
NRBC # BLD: 0 K/UL (ref 0–0.01)
NRBC BLD-RTO: 0 PER 100 WBC
PH UR STRIP: 7.5 (ref 5–8)
PLATELET # BLD AUTO: 278 K/UL (ref 150–400)
PMV BLD AUTO: 10.7 FL (ref 8.9–12.9)
POTASSIUM SERPL-SCNC: 4.2 MMOL/L (ref 3.5–5.1)
PROT SERPL-MCNC: 7.2 G/DL (ref 6.4–8.2)
PROT UR STRIP-MCNC: NEGATIVE MG/DL
RBC # BLD AUTO: 5.05 M/UL (ref 3.8–5.2)
RBC #/AREA URNS HPF: ABNORMAL /HPF (ref 0–5)
SODIUM SERPL-SCNC: 136 MMOL/L (ref 136–145)
SP GR UR REFRACTOMETRY: 1 (ref 1–1.03)
SPECIMEN HOLD: NORMAL
SPECIMEN HOLD: NORMAL
TROPONIN I SERPL HS-MCNC: 4 NG/L (ref 0–51)
UROBILINOGEN UR QL STRIP.AUTO: 0.2 EU/DL (ref 0.2–1)
WBC # BLD AUTO: 8.4 K/UL (ref 3.6–11)
WBC URNS QL MICRO: ABNORMAL /HPF (ref 0–4)

## 2024-09-10 PROCEDURE — 84484 ASSAY OF TROPONIN QUANT: CPT

## 2024-09-10 PROCEDURE — 71046 X-RAY EXAM CHEST 2 VIEWS: CPT

## 2024-09-10 PROCEDURE — 36415 COLL VENOUS BLD VENIPUNCTURE: CPT

## 2024-09-10 PROCEDURE — 85379 FIBRIN DEGRADATION QUANT: CPT

## 2024-09-10 PROCEDURE — 81001 URINALYSIS AUTO W/SCOPE: CPT

## 2024-09-10 PROCEDURE — 81025 URINE PREGNANCY TEST: CPT

## 2024-09-10 PROCEDURE — 83690 ASSAY OF LIPASE: CPT

## 2024-09-10 PROCEDURE — 85025 COMPLETE CBC W/AUTO DIFF WBC: CPT

## 2024-09-10 PROCEDURE — 80053 COMPREHEN METABOLIC PANEL: CPT

## 2024-09-10 PROCEDURE — 99285 EMERGENCY DEPT VISIT HI MDM: CPT

## 2024-09-10 PROCEDURE — 93005 ELECTROCARDIOGRAM TRACING: CPT | Performed by: EMERGENCY MEDICINE

## 2024-09-10 ASSESSMENT — ENCOUNTER SYMPTOMS
COUGH: 0
VOMITING: 0
DIARRHEA: 0
TROUBLE SWALLOWING: 0
ABDOMINAL PAIN: 0
NAUSEA: 0
BACK PAIN: 0
SHORTNESS OF BREATH: 1

## 2024-09-10 ASSESSMENT — PAIN DESCRIPTION - DESCRIPTORS: DESCRIPTORS: ACHING;SHARP

## 2024-09-10 ASSESSMENT — PAIN DESCRIPTION - ORIENTATION: ORIENTATION: MID

## 2024-09-10 ASSESSMENT — PAIN DESCRIPTION - LOCATION: LOCATION: CHEST

## 2024-09-10 ASSESSMENT — PAIN - FUNCTIONAL ASSESSMENT
PAIN_FUNCTIONAL_ASSESSMENT: ACTIVITIES ARE NOT PREVENTED
PAIN_FUNCTIONAL_ASSESSMENT: 0-10

## 2024-09-10 ASSESSMENT — PAIN DESCRIPTION - ONSET: ONSET: ON-GOING

## 2024-09-10 ASSESSMENT — PAIN DESCRIPTION - PAIN TYPE: TYPE: ACUTE PAIN

## 2024-09-10 ASSESSMENT — PAIN SCALES - GENERAL
PAINLEVEL_OUTOF10: 5
PAINLEVEL_OUTOF10: 0

## 2024-09-10 ASSESSMENT — PAIN DESCRIPTION - FREQUENCY: FREQUENCY: INTERMITTENT

## 2024-09-13 LAB
EKG ATRIAL RATE: 83 BPM
EKG DIAGNOSIS: NORMAL
EKG P AXIS: 71 DEGREES
EKG P-R INTERVAL: 134 MS
EKG Q-T INTERVAL: 352 MS
EKG QRS DURATION: 76 MS
EKG QTC CALCULATION (BAZETT): 413 MS
EKG R AXIS: 67 DEGREES
EKG T AXIS: 47 DEGREES
EKG VENTRICULAR RATE: 83 BPM

## 2024-09-13 PROCEDURE — 93010 ELECTROCARDIOGRAM REPORT: CPT | Performed by: SPECIALIST

## 2025-05-12 NOTE — PROGRESS NOTES
HISTORY OF PRESENT ILLNESS Ricardo Pimentel is a 45 y.o. female. HPI NEW Patient presents for consultation at the request of Dr. Noelle Ruiz for painful lump in RIGHT axilla. Symptoms started in July. She had imaging done which was normal. She is also a high risk patient. [facility] calculated her [place] to be 33.1%. Family history- 
Paternal aunt had breast cancer, age unknown. Maternal aunt had breast cancer diagnosed with breast cancer in her 42's. . Paternal grandmother had breast cancer in her 63's. Maternal aunt had breast cancer in her 63's. The patient had genetic testing done at [facility] and tested negative. Breast imaging- 
2020 - mammogram and US done at [facility]: BI-RADS 2 Review of Systems Constitutional: Negative. HENT: Negative. Eyes: Negative. Respiratory: Negative. Cardiovascular: Negative. Gastrointestinal: Negative. Genitourinary: Negative. Musculoskeletal: Negative. Skin: Negative. Neurological: Negative. Endo/Heme/Allergies: Negative. Psychiatric/Behavioral: Negative. Physical Exam 
 
ASSESSMENT and PLAN 
{ASSESSMENT/PLAN:[id number]} 12-May-2025 21:01